# Patient Record
Sex: MALE | Race: WHITE | Employment: OTHER | URBAN - METROPOLITAN AREA
[De-identification: names, ages, dates, MRNs, and addresses within clinical notes are randomized per-mention and may not be internally consistent; named-entity substitution may affect disease eponyms.]

---

## 2017-06-23 PROBLEM — C34.12 SQUAMOUS CELL CARCINOMA OF BRONCHUS IN LEFT UPPER LOBE (HCC): Status: ACTIVE | Noted: 2017-06-23

## 2017-06-23 PROBLEM — C34.12 SQUAMOUS CELL CARCINOMA OF BRONCHUS IN LEFT UPPER LOBE (HCC): Status: RESOLVED | Noted: 2017-06-23 | Resolved: 2017-06-23

## 2017-06-23 PROBLEM — C34.32 SQUAMOUS CELL CARCINOMA OF BRONCHUS IN LEFT LOWER LOBE (HCC): Status: ACTIVE | Noted: 2017-06-23

## 2018-06-27 ENCOUNTER — HOSPITAL ENCOUNTER (OUTPATIENT)
Dept: RADIATION ONCOLOGY | Age: 73
Discharge: HOME | End: 2018-06-27
Payer: COMMERCIAL

## 2018-06-27 VITALS
RESPIRATION RATE: 18 BRPM | DIASTOLIC BLOOD PRESSURE: 67 MMHG | WEIGHT: 210 LBS | HEART RATE: 62 BPM | TEMPERATURE: 97.7 F | SYSTOLIC BLOOD PRESSURE: 101 MMHG

## 2018-06-27 DIAGNOSIS — C34.32 SQUAMOUS CELL CARCINOMA OF BRONCHUS IN LEFT LOWER LOBE (HCC): Primary | ICD-10-CM

## 2018-06-27 PROCEDURE — 99212 OFFICE O/P EST SF 10 MIN: CPT | Performed by: RADIOLOGY

## 2018-06-27 PROCEDURE — 99212 OFFICE O/P EST SF 10 MIN: CPT

## 2018-06-27 RX ORDER — PREGABALIN 100 MG/1
100 CAPSULE ORAL 2 TIMES DAILY
COMMUNITY
End: 2021-01-15 | Stop reason: ALTCHOICE

## 2018-06-27 RX ORDER — CALCIUM CARBONATE 200(500)MG
1 TABLET,CHEWABLE ORAL DAILY
COMMUNITY
End: 2021-01-15 | Stop reason: ALTCHOICE

## 2021-01-15 ENCOUNTER — HOSPITAL ENCOUNTER (INPATIENT)
Age: 76
LOS: 5 days | Discharge: HOME | DRG: 660 | End: 2021-01-20
Attending: EMERGENCY MEDICINE | Admitting: INTERNAL MEDICINE
Payer: MEDICARE

## 2021-01-15 ENCOUNTER — APPOINTMENT (OUTPATIENT)
Dept: ULTRASOUND IMAGING | Age: 76
DRG: 660 | End: 2021-01-15
Payer: MEDICARE

## 2021-01-15 DIAGNOSIS — N17.9 ACUTE KIDNEY INJURY (HCC): Primary | ICD-10-CM

## 2021-01-15 DIAGNOSIS — E87.5 HYPERKALEMIA: ICD-10-CM

## 2021-01-15 DIAGNOSIS — T80.818A: ICD-10-CM

## 2021-01-15 DIAGNOSIS — N17.9 AKI (ACUTE KIDNEY INJURY) (HCC): ICD-10-CM

## 2021-01-15 DIAGNOSIS — N20.0 KIDNEY STONE: ICD-10-CM

## 2021-01-15 PROBLEM — C34.32 MALIGNANT NEOPLASM OF LOWER LOBE OF LEFT LUNG (HCC): Status: ACTIVE | Noted: 2017-05-11

## 2021-01-15 PROBLEM — N40.1 BPH WITH OBSTRUCTION/LOWER URINARY TRACT SYMPTOMS: Status: ACTIVE | Noted: 2021-01-15

## 2021-01-15 PROBLEM — I10 BENIGN ESSENTIAL HTN: Status: ACTIVE | Noted: 2021-01-15

## 2021-01-15 PROBLEM — N40.0 BPH (BENIGN PROSTATIC HYPERPLASIA): Status: ACTIVE | Noted: 2021-01-15

## 2021-01-15 PROBLEM — I25.10 CORONARY ARTERY DISEASE INVOLVING NATIVE CORONARY ARTERY OF NATIVE HEART WITHOUT ANGINA PECTORIS: Status: ACTIVE | Noted: 2017-04-28

## 2021-01-15 PROBLEM — N13.8 BPH WITH OBSTRUCTION/LOWER URINARY TRACT SYMPTOMS: Status: ACTIVE | Noted: 2021-01-15

## 2021-01-15 PROBLEM — Z95.1 HX OF CORONARY ARTERY BYPASS GRAFT: Status: ACTIVE | Noted: 2021-01-15

## 2021-01-15 PROBLEM — D64.9 NORMOCYTIC ANEMIA: Status: ACTIVE | Noted: 2021-01-15

## 2021-01-15 PROBLEM — Z85.118 HISTORY OF LUNG CANCER: Status: ACTIVE | Noted: 2017-04-17

## 2021-01-15 PROBLEM — C64.9 RENAL CANCER (HCC): Status: ACTIVE | Noted: 2021-01-15

## 2021-01-15 PROBLEM — N18.9 ACUTE KIDNEY INJURY SUPERIMPOSED ON CKD (HCC): Status: ACTIVE | Noted: 2021-01-15

## 2021-01-15 LAB
ALBUMIN SERPL-MCNC: 3.3 G/DL (ref 3.5–5.2)
ALP BLD-CCNC: 68 U/L (ref 40–129)
ALT SERPL-CCNC: 7 U/L (ref 0–40)
ANION GAP SERPL CALCULATED.3IONS-SCNC: 10 MMOL/L (ref 7–16)
ANION GAP SERPL CALCULATED.3IONS-SCNC: 11 MMOL/L (ref 7–16)
AST SERPL-CCNC: 13 U/L (ref 0–39)
BASOPHILS ABSOLUTE: 0.02 E9/L (ref 0–0.2)
BASOPHILS RELATIVE PERCENT: 0.3 % (ref 0–2)
BILIRUB SERPL-MCNC: 0.3 MG/DL (ref 0–1.2)
BILIRUBIN URINE: NEGATIVE
BLOOD, URINE: NEGATIVE
BUN BLDV-MCNC: 39 MG/DL (ref 8–23)
BUN BLDV-MCNC: 42 MG/DL (ref 8–23)
CALCIUM SERPL-MCNC: 8.8 MG/DL (ref 8.6–10.2)
CALCIUM SERPL-MCNC: 8.9 MG/DL (ref 8.6–10.2)
CHLORIDE BLD-SCNC: 109 MMOL/L (ref 98–107)
CHLORIDE BLD-SCNC: 111 MMOL/L (ref 98–107)
CLARITY: CLEAR
CO2: 15 MMOL/L (ref 22–29)
CO2: 17 MMOL/L (ref 22–29)
COLOR: NORMAL
CREAT SERPL-MCNC: 3.5 MG/DL (ref 0.7–1.2)
CREAT SERPL-MCNC: 3.5 MG/DL (ref 0.7–1.2)
CREATININE URINE: 39 MG/DL (ref 40–278)
EOSINOPHILS ABSOLUTE: 0.02 E9/L (ref 0.05–0.5)
EOSINOPHILS RELATIVE PERCENT: 0.3 % (ref 0–6)
GFR AFRICAN AMERICAN: 21
GFR AFRICAN AMERICAN: 21
GFR NON-AFRICAN AMERICAN: 17 ML/MIN/1.73
GFR NON-AFRICAN AMERICAN: 17 ML/MIN/1.73
GLUCOSE BLD-MCNC: 90 MG/DL (ref 74–99)
GLUCOSE BLD-MCNC: 98 MG/DL (ref 74–99)
GLUCOSE URINE: NEGATIVE MG/DL
HCT VFR BLD CALC: 32.5 % (ref 37–54)
HEMOGLOBIN: 9.9 G/DL (ref 12.5–16.5)
IMMATURE GRANULOCYTES #: 0.02 E9/L
IMMATURE GRANULOCYTES %: 0.3 % (ref 0–5)
KETONES, URINE: NEGATIVE MG/DL
LEUKOCYTE ESTERASE, URINE: NEGATIVE
LYMPHOCYTES ABSOLUTE: 1.1 E9/L (ref 1.5–4)
LYMPHOCYTES RELATIVE PERCENT: 19.1 % (ref 20–42)
MAGNESIUM: 1.5 MG/DL (ref 1.6–2.6)
MCH RBC QN AUTO: 30.1 PG (ref 26–35)
MCHC RBC AUTO-ENTMCNC: 30.5 % (ref 32–34.5)
MCV RBC AUTO: 98.8 FL (ref 80–99.9)
MONOCYTES ABSOLUTE: 0.49 E9/L (ref 0.1–0.95)
MONOCYTES RELATIVE PERCENT: 8.5 % (ref 2–12)
NEUTROPHILS ABSOLUTE: 4.12 E9/L (ref 1.8–7.3)
NEUTROPHILS RELATIVE PERCENT: 71.5 % (ref 43–80)
NITRITE, URINE: NEGATIVE
PDW BLD-RTO: 18.3 FL (ref 11.5–15)
PH UA: 6 (ref 5–9)
PHOSPHORUS: 3.3 MG/DL (ref 2.5–4.5)
PLATELET # BLD: 252 E9/L (ref 130–450)
PMV BLD AUTO: 10.2 FL (ref 7–12)
POTASSIUM REFLEX MAGNESIUM: 5.6 MMOL/L (ref 3.5–5)
POTASSIUM SERPL-SCNC: 5.1 MMOL/L (ref 3.5–5)
PROTEIN UA: NEGATIVE MG/DL
RBC # BLD: 3.29 E12/L (ref 3.8–5.8)
SODIUM BLD-SCNC: 136 MMOL/L (ref 132–146)
SODIUM BLD-SCNC: 137 MMOL/L (ref 132–146)
SODIUM URINE: 157 MMOL/L
SPECIFIC GRAVITY UA: 1.01 (ref 1–1.03)
TOTAL CK: 66 U/L (ref 20–200)
TOTAL PROTEIN: 6.2 G/DL (ref 6.4–8.3)
TROPONIN: <0.01 NG/ML (ref 0–0.03)
TROPONIN: <0.01 NG/ML (ref 0–0.03)
UROBILINOGEN, URINE: 0.2 E.U./DL
WBC # BLD: 5.8 E9/L (ref 4.5–11.5)

## 2021-01-15 PROCEDURE — 96372 THER/PROPH/DIAG INJ SC/IM: CPT

## 2021-01-15 PROCEDURE — 99223 1ST HOSP IP/OBS HIGH 75: CPT | Performed by: INTERNAL MEDICINE

## 2021-01-15 PROCEDURE — 6360000002 HC RX W HCPCS: Performed by: EMERGENCY MEDICINE

## 2021-01-15 PROCEDURE — 82570 ASSAY OF URINE CREATININE: CPT

## 2021-01-15 PROCEDURE — 85025 COMPLETE CBC W/AUTO DIFF WBC: CPT

## 2021-01-15 PROCEDURE — 2580000003 HC RX 258: Performed by: STUDENT IN AN ORGANIZED HEALTH CARE EDUCATION/TRAINING PROGRAM

## 2021-01-15 PROCEDURE — 51798 US URINE CAPACITY MEASURE: CPT

## 2021-01-15 PROCEDURE — 96365 THER/PROPH/DIAG IV INF INIT: CPT

## 2021-01-15 PROCEDURE — 6370000000 HC RX 637 (ALT 250 FOR IP): Performed by: STUDENT IN AN ORGANIZED HEALTH CARE EDUCATION/TRAINING PROGRAM

## 2021-01-15 PROCEDURE — 93005 ELECTROCARDIOGRAM TRACING: CPT | Performed by: STUDENT IN AN ORGANIZED HEALTH CARE EDUCATION/TRAINING PROGRAM

## 2021-01-15 PROCEDURE — 83735 ASSAY OF MAGNESIUM: CPT

## 2021-01-15 PROCEDURE — 6360000002 HC RX W HCPCS: Performed by: STUDENT IN AN ORGANIZED HEALTH CARE EDUCATION/TRAINING PROGRAM

## 2021-01-15 PROCEDURE — 1200000000 HC SEMI PRIVATE

## 2021-01-15 PROCEDURE — 99283 EMERGENCY DEPT VISIT LOW MDM: CPT

## 2021-01-15 PROCEDURE — 84484 ASSAY OF TROPONIN QUANT: CPT

## 2021-01-15 PROCEDURE — 76770 US EXAM ABDO BACK WALL COMP: CPT

## 2021-01-15 PROCEDURE — 80048 BASIC METABOLIC PNL TOTAL CA: CPT

## 2021-01-15 PROCEDURE — 84300 ASSAY OF URINE SODIUM: CPT

## 2021-01-15 PROCEDURE — 82550 ASSAY OF CK (CPK): CPT

## 2021-01-15 PROCEDURE — 80053 COMPREHEN METABOLIC PANEL: CPT

## 2021-01-15 PROCEDURE — 81003 URINALYSIS AUTO W/O SCOPE: CPT

## 2021-01-15 PROCEDURE — 84100 ASSAY OF PHOSPHORUS: CPT

## 2021-01-15 PROCEDURE — 96375 TX/PRO/DX INJ NEW DRUG ADDON: CPT

## 2021-01-15 PROCEDURE — 36415 COLL VENOUS BLD VENIPUNCTURE: CPT

## 2021-01-15 RX ORDER — ENALAPRIL MALEATE 20 MG/1
10 TABLET ORAL DAILY
Status: ON HOLD | COMMUNITY
End: 2021-01-20 | Stop reason: HOSPADM

## 2021-01-15 RX ORDER — TAMSULOSIN HYDROCHLORIDE 0.4 MG/1
0.4 CAPSULE ORAL DAILY
Status: ON HOLD | COMMUNITY
End: 2021-01-20 | Stop reason: SDUPTHER

## 2021-01-15 RX ORDER — DEXTROSE MONOHYDRATE 25 G/50ML
25 INJECTION, SOLUTION INTRAVENOUS ONCE
Status: COMPLETED | OUTPATIENT
Start: 2021-01-15 | End: 2021-01-15

## 2021-01-15 RX ORDER — GABAPENTIN 300 MG/1
600 CAPSULE ORAL 2 TIMES DAILY
Status: DISCONTINUED | OUTPATIENT
Start: 2021-01-15 | End: 2021-01-20 | Stop reason: HOSPADM

## 2021-01-15 RX ORDER — ASPIRIN 81 MG/1
81 TABLET ORAL DAILY
Status: DISCONTINUED | OUTPATIENT
Start: 2021-01-16 | End: 2021-01-20 | Stop reason: HOSPADM

## 2021-01-15 RX ORDER — CARVEDILOL 6.25 MG/1
12.5 TABLET ORAL 2 TIMES DAILY WITH MEALS
Status: DISCONTINUED | OUTPATIENT
Start: 2021-01-15 | End: 2021-01-20 | Stop reason: HOSPADM

## 2021-01-15 RX ORDER — ALPRAZOLAM 0.5 MG/1
0.5 TABLET ORAL PRN
Status: DISCONTINUED | OUTPATIENT
Start: 2021-01-15 | End: 2021-01-20 | Stop reason: HOSPADM

## 2021-01-15 RX ORDER — DIPHENHYDRAMINE HCL 25 MG
50 TABLET ORAL DAILY
COMMUNITY

## 2021-01-15 RX ORDER — ZINC GLUCONATE 50 MG
50 TABLET ORAL DAILY
COMMUNITY

## 2021-01-15 RX ORDER — SODIUM CHLORIDE 9 MG/ML
0.9 INJECTION INTRAVENOUS ONCE
Status: DISCONTINUED | OUTPATIENT
Start: 2021-01-15 | End: 2021-01-20 | Stop reason: HOSPADM

## 2021-01-15 RX ORDER — UMECLIDINIUM 62.5 UG/1
1 AEROSOL, POWDER ORAL DAILY
COMMUNITY

## 2021-01-15 RX ORDER — CALCIUM CARBONATE 200(500)MG
1 TABLET,CHEWABLE ORAL DAILY PRN
Status: DISCONTINUED | OUTPATIENT
Start: 2021-01-15 | End: 2021-01-20 | Stop reason: HOSPADM

## 2021-01-15 RX ORDER — ATORVASTATIN CALCIUM 20 MG/1
20 TABLET, FILM COATED ORAL DAILY
Status: DISCONTINUED | OUTPATIENT
Start: 2021-01-16 | End: 2021-01-20 | Stop reason: HOSPADM

## 2021-01-15 RX ORDER — ACETAMINOPHEN 325 MG/1
650 TABLET ORAL EVERY 6 HOURS PRN
Status: DISCONTINUED | OUTPATIENT
Start: 2021-01-15 | End: 2021-01-20 | Stop reason: HOSPADM

## 2021-01-15 RX ORDER — DEXAMETHASONE 4 MG/1
8 TABLET ORAL SEE ADMIN INSTRUCTIONS
COMMUNITY

## 2021-01-15 RX ORDER — SODIUM CHLORIDE 0.9 % (FLUSH) 0.9 %
10 SYRINGE (ML) INJECTION EVERY 12 HOURS SCHEDULED
Status: DISCONTINUED | OUTPATIENT
Start: 2021-01-15 | End: 2021-01-20 | Stop reason: HOSPADM

## 2021-01-15 RX ORDER — DEXAMETHASONE SODIUM PHOSPHATE 4 MG/ML
4 INJECTION, SOLUTION INTRA-ARTICULAR; INTRALESIONAL; INTRAMUSCULAR; INTRAVENOUS; SOFT TISSUE EVERY 24 HOURS
Status: DISCONTINUED | OUTPATIENT
Start: 2021-01-15 | End: 2021-01-20 | Stop reason: HOSPADM

## 2021-01-15 RX ORDER — 0.9 % SODIUM CHLORIDE 0.9 %
1000 INTRAVENOUS SOLUTION INTRAVENOUS ONCE
Status: COMPLETED | OUTPATIENT
Start: 2021-01-15 | End: 2021-01-15

## 2021-01-15 RX ORDER — PROMETHAZINE HYDROCHLORIDE 25 MG/1
12.5 TABLET ORAL EVERY 6 HOURS PRN
Status: DISCONTINUED | OUTPATIENT
Start: 2021-01-15 | End: 2021-01-20 | Stop reason: HOSPADM

## 2021-01-15 RX ORDER — SODIUM CHLORIDE 0.9 % (FLUSH) 0.9 %
10 SYRINGE (ML) INJECTION PRN
Status: DISCONTINUED | OUTPATIENT
Start: 2021-01-15 | End: 2021-01-20 | Stop reason: HOSPADM

## 2021-01-15 RX ORDER — ACETAMINOPHEN 500 MG
1000 TABLET ORAL EVERY 6 HOURS PRN
COMMUNITY

## 2021-01-15 RX ORDER — TAMSULOSIN HYDROCHLORIDE 0.4 MG/1
0.4 CAPSULE ORAL NIGHTLY
Status: DISCONTINUED | OUTPATIENT
Start: 2021-01-15 | End: 2021-01-20 | Stop reason: HOSPADM

## 2021-01-15 RX ORDER — M-VIT,TX,IRON,MINS/CALC/FOLIC 27MG-0.4MG
1 TABLET ORAL DAILY
COMMUNITY

## 2021-01-15 RX ORDER — SODIUM CHLORIDE 9 MG/ML
INJECTION, SOLUTION INTRAVENOUS CONTINUOUS
Status: DISCONTINUED | OUTPATIENT
Start: 2021-01-15 | End: 2021-01-16

## 2021-01-15 RX ORDER — POLYETHYLENE GLYCOL 3350 17 G/17G
17 POWDER, FOR SOLUTION ORAL DAILY PRN
Status: DISCONTINUED | OUTPATIENT
Start: 2021-01-15 | End: 2021-01-20 | Stop reason: HOSPADM

## 2021-01-15 RX ORDER — GABAPENTIN 300 MG/1
600 CAPSULE ORAL 2 TIMES DAILY
COMMUNITY

## 2021-01-15 RX ORDER — LANOLIN ALCOHOL/MO/W.PET/CERES
1000 CREAM (GRAM) TOPICAL DAILY
COMMUNITY

## 2021-01-15 RX ORDER — SODIUM CHLORIDE 9 MG/ML
0.9 INJECTION INTRAVENOUS ONCE
Status: DISCONTINUED | OUTPATIENT
Start: 2021-01-15 | End: 2021-01-15

## 2021-01-15 RX ORDER — ACETAMINOPHEN 650 MG/1
650 SUPPOSITORY RECTAL EVERY 6 HOURS PRN
Status: DISCONTINUED | OUTPATIENT
Start: 2021-01-15 | End: 2021-01-20 | Stop reason: HOSPADM

## 2021-01-15 RX ORDER — CALCIUM CARBONATE 300MG(750)
400 TABLET,CHEWABLE ORAL DAILY
COMMUNITY

## 2021-01-15 RX ORDER — ONDANSETRON 2 MG/ML
4 INJECTION INTRAMUSCULAR; INTRAVENOUS EVERY 6 HOURS PRN
Status: DISCONTINUED | OUTPATIENT
Start: 2021-01-15 | End: 2021-01-20 | Stop reason: HOSPADM

## 2021-01-15 RX ORDER — HEPARIN SODIUM 5000 [USP'U]/ML
5000 INJECTION, SOLUTION INTRAVENOUS; SUBCUTANEOUS EVERY 8 HOURS SCHEDULED
Status: DISCONTINUED | OUTPATIENT
Start: 2021-01-15 | End: 2021-01-20 | Stop reason: HOSPADM

## 2021-01-15 RX ORDER — CALCIUM CARBONATE 500(1250)
500 TABLET ORAL DAILY
COMMUNITY

## 2021-01-15 RX ADMIN — SODIUM CHLORIDE, PRESERVATIVE FREE 10 ML: 5 INJECTION INTRAVENOUS at 20:46

## 2021-01-15 RX ADMIN — SODIUM CHLORIDE: 9 INJECTION, SOLUTION INTRAVENOUS at 20:47

## 2021-01-15 RX ADMIN — DEXTROSE MONOHYDRATE 25 G: 25 INJECTION, SOLUTION INTRAVENOUS at 13:44

## 2021-01-15 RX ADMIN — SODIUM CHLORIDE 1000 ML: 9 INJECTION, SOLUTION INTRAVENOUS at 11:53

## 2021-01-15 RX ADMIN — GABAPENTIN 600 MG: 300 CAPSULE ORAL at 18:54

## 2021-01-15 RX ADMIN — HEPARIN SODIUM 5000 UNITS: 5000 INJECTION INTRAVENOUS; SUBCUTANEOUS at 20:46

## 2021-01-15 RX ADMIN — CARVEDILOL 12.5 MG: 6.25 TABLET, FILM COATED ORAL at 18:54

## 2021-01-15 RX ADMIN — TAMSULOSIN HYDROCHLORIDE 0.4 MG: 0.4 CAPSULE ORAL at 20:46

## 2021-01-15 RX ADMIN — CALCIUM GLUCONATE 1 G: 98 INJECTION, SOLUTION INTRAVENOUS at 11:53

## 2021-01-15 RX ADMIN — HYALURONIDASE (HUMAN RECOMBINANT) 15 UNITS: 150 INJECTION, SOLUTION SUBCUTANEOUS at 13:00

## 2021-01-15 RX ADMIN — INSULIN HUMAN 5 UNITS: 100 INJECTION, SOLUTION PARENTERAL at 13:44

## 2021-01-15 RX ADMIN — DEXAMETHASONE SODIUM PHOSPHATE 4 MG: 4 INJECTION, SOLUTION INTRAMUSCULAR; INTRAVENOUS at 18:55

## 2021-01-15 ASSESSMENT — ENCOUNTER SYMPTOMS
WHEEZING: 0
ABDOMINAL PAIN: 0
COUGH: 0
BACK PAIN: 0
VOMITING: 0
EYE PAIN: 0
EYE DISCHARGE: 0
SHORTNESS OF BREATH: 0
SINUS PRESSURE: 0
EYE REDNESS: 0
DIARRHEA: 0
NAUSEA: 0
SORE THROAT: 0

## 2021-01-15 NOTE — CONSULTS
Nephrology Consult Note  Patient's Name: Esperanza Bush  6:26 PM  1/15/2021    Nephrologist: Dr Kaitlin Ramirez    Reason for Consult:  BRITNI and hyperkalemia  Requesting Physician:  No primary care provider on file. Chief Complaint:  Abnormal labs    History Obtained From:  patient and past medical records    History of Present Ilness:    Esperanza Bush is a 70 y.o. 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 He was sent to the ER by the [facility] for abnormal labs. Per the patient he is receiving treatment for his cancer, he is unsure what is being given. His renal function has been abnormal recently and his most recent lab work prompted them to have him come to the ER. He states he began chemo aprox 2 weeks ago, he was receiving it 3 times per week. His baseline Cr in Dec 2020 was 1.46mg/dl. Labs upon arrival to the ER show Na 136, K 5.6, Cl 111, CO2 15, BUN 42, Cr 3.5. Hyperkalemia was treated medically in the ER. This morning his K remained elevated at 6.5, it was again treated medically. He was taking enalapril PTA, it is currently being held. DEBBI showed hydronephrosis. Upon assessment, patient just returned to his room from CT scan. He states he is feeling ok. He admits to having diarrhea for the past 2 weeks as well as decreased appetite. He denies any kidney stones or hematuria. Past Medical History:   Diagnosis Date    Heart attack [facility]) 2009    Hypertension 2009    Lung cancer [facility]) 2017    Renal mass        Past Surgical History:   Procedure Laterality Date    APPENDECTOMY  1965    LOBECTOMY Left 05/2017       Family History   Problem Relation Age of Onset    Cancer Father 71        lung        reports that he quit smoking about 11 years ago. His smoking use included cigarettes. He has a 40.00 pack-year smoking history. He has never used smokeless tobacco. He reports that he does not drink alcohol or use drugs. Allergies:  Patient has no known allergies. Current Medications:        sodium chloride (PF) 0.9 % injection 0.9 mL, Once      dexamethasone (DECADRON) injection 4 mg, Q24H      tamsulosin (FLOMAX) capsule 0.4 mg, Nightly      gabapentin (NEURONTIN) capsule 600 mg, BID        Review of Systems:   Pertinent items are noted in HPI. Physical exam:   Constitutional:    Vitals: VITALS:  BP (!) 169/88   Pulse 89   Temp 97.1 °F (36.2 °C)   Resp 16   SpO2 99%   24HR INTAKE/OUTPUT:  No intake or output data in the 24 hours ending 01/15/21 1826  URINARY CATHETER OUTPUT (Garcia):     Skin: no rash, turgor wnl  Heent:  eomi, mmm  Neck: no bruits or jvd noted  Cardiovascular:  S1, S2 without m/r/g.  Right chest mediport  Respiratory: CTA B without w/r/r  Abdomen:  +bs, soft, nt, nd  Ext: neg lower extremity edema  Psychiatric: mood and affect appropriate  Musculoskeletal:  Rom, muscular strength intact    Data:   Labs:  CBC:   Lab Results   Component Value Date    WBC 3.1 01/16/2021    RBC 3.17 01/16/2021    HGB 9.4 01/16/2021    HCT 31.7 01/16/2021    .0 01/16/2021    MCH 29.7 01/16/2021    MCHC 29.7 01/16/2021    RDW 18.4 01/16/2021     01/16/2021    MPV 10.7 01/16/2021     BMP:    Lab Results   Component Value Date     01/16/2021    K 6.5 01/16/2021    K 5.6 01/15/2021     01/16/2021    CO2 17 01/16/2021    BUN 41 01/16/2021    LABALBU 3.3 01/15/2021    CREATININE 3.5 01/16/2021    CALCIUM 9.0 01/16/2021    GFRAA 21 01/16/2021    LABGLOM 17 01/16/2021    GLUCOSE 117 01/16/2021     Ionized Calcium:  No results found for: IONCA  Magnesium:    Lab Results   Component Value Date    MG 1.6 01/16/2021     Phosphorus:    Lab Results   Component Value Date    PHOS 3.3 01/15/2021     VITAMIN B12: No components found for: B12  FOLATE:  No results found for: FOLATE  IRON:  No results found for: IRON  Iron Saturation:  No components found for: PERCENTFE  TIBC:  No results found for: TIBC  FERRITIN:  No results found for: FERRITIN     Imaging: CT ABDOMEN PELVIS WO CONTRAST [[id number]] Collected: 01/16/21 1018      Order Status: Completed Updated: 01/16/21 1038     Narrative:       EXAMINATION:   CT OF THE ABDOMEN AND PELVIS WITHOUT CONTRAST 1/16/2021 8:49 am   TECHNIQUE:   CT of the abdomen and pelvis was performed without the administration of   intravenous contrast. Multiplanar reformatted images are provided for review. Dose modulation, iterative reconstruction, and/or weight based adjustment of   the mA/kV was utilized to reduce the radiation dose to as low as reasonably   achievable. COMPARISON:   None. HISTORY:   ORDERING SYSTEM PROVIDED HISTORY: hydronephrosis   TECHNOLOGIST PROVIDED HISTORY:   Reason for exam:->hydronephrosis   FINDINGS:   Lower Chest: There is small pleural effusions posteriorly.  The left lung   base is moderately atelectatic. Organs:  The gallbladder is unremarkable.  The liver is normal for non   enhanced technique.  The adrenal glands are normal.  The spleen is intact and   demonstrates normal morphology and attenuation/echogenicity. Lorelie Smith  pancreas   demonstrates normal morphology and attenuation/echogenicity. Lorelie Smith left kidney   is moderately enlarged, there is moderate hydronephrosis but no sign of an   obstructing mass or calculus.  A 5 mm calcification in the central right   kidney may represent a calculus or vascular calcification.  A 3 cm cyst lies   in the upper pole of the right kidney.  The right kidney is minimally   hydronephrotic. Pedro Rain is a small hiatal hernia. GI/Bowel: The GI tract has normal course caliber and morphology.  There are   scattered colonic diverticuli. The appendix is not identified, there are no   secondary signs of appendicitis or right lower quadrant inflammation. Pelvis: The prostate is mildly hypertrophied. Bladder is unremarkable in   appearance.    Peritoneum/Retroperitoneum: Minimal free fluid lies within the pelvis. Pedro Rain are no signs of pelvic or retro peritoneal lymphadenopathy. Vasculature: The abdominal aorta, IVC and their branches are normal aside   from mild aortic atherosclerotic vascular disease. Bones/Soft Tissues: The hips, bony pelvis, and lumbar spine appear   unremarkable.  The abdominal wall is normal aside from small fat containing   umbilical and inguinal hernias.     Impression:       Moderate left and minimal right hydronephrosis, no sign of obstructing   calculus or mass, suspect bladder outlet obstruction   Small pleural effusions, may minimal free intraperitoneal fluid. Left lower lobe atelectasis     US RETROPERITONEAL COMPLETE [[id number]] Collected: 01/15/21 2149     Order Status: Completed Updated: 01/15/21 2215     Narrative:       EXAMINATION:   RETROPERITONEAL ULTRASOUND OF THE KIDNEYS AND URINARY BLADDER   1/15/2021   COMPARISON:   None   HISTORY:   ORDERING SYSTEM PROVIDED HISTORY: BRITNI on CKD   TECHNOLOGIST PROVIDED HISTORY:   Reason for exam:->BRITNI on CKD   What reading provider will be dictating this exam?->CRC   FINDINGS:   Kidneys: The right kidney measures 8.7 x 4.7 x 4.3cm in length and the left kidney   measures 11 x 6.6 x 5.8 cm in length. Kidneys demonstrate normal cortical echogenicity.  There is mild   hydronephrosis within the left kidney.  No evidence of hydronephrosis or   intrarenal stones. Bladder:   Unremarkable appearance of the bladder.  No significant post void   residual.The urinary bladder appears unremarkable. Right urinary jet is   noted. Left urinary jet is not visible.     Impression:       Right kidney is mildly atrophic. Mild hydronephrosis of left kidney. Bladder is unremarkable. Left urinary jet is not visualized           Assessment and Plan  1.  Stage II BRITNI in the setting of urinary outlet obstruction exacerbated by the diarrhea and decreased PO intake and ACEI  CT abd with moderate hydro L and mild hydro R kidney  FeNa >1% supporting intrarenal origin Bladder scan with 264mL noted  Cr 3.5  PLAN: 1. Follow labs  2. Consult urology  3. Insert bourne catheter  4. Hold ACEI  5. Continue IVF    2. HTN  BP at goal <130/80  PLAN: 1. Continue current medications    3. Hyperkalemia in the setting of the BRITNI  K 6.5-->Treated medically this AM  PLAN: 1. Repeat BMP at 1200  2. Low K diet    4. NAG metabolic acidosis in the setting of the BRITNI  HCO3 goal >=22  PLAN: 1. Start PO HCO3    5. Anemia   Hgb below goal >10  PLAN: 1. Defer to heme/onc    6. CKD G3A with baseline Cr 1.46mg/dl and eGFR 47ml/min    7. Lung cancer with mets to kidney  Follows with [facility]  Currently receiving Chemotherapy 3 times per week  PLAN: 1. Defer to heme/onc    Thank you Dr. Lucas primary care provider on file.  for allowing us to participate in care of ALTA Kruger NP  6:26 PM  1/15/2021

## 2021-01-15 NOTE — ED PROVIDER NOTES
[facility]  Department of Emergency Medicine     Written by: Malinda Blizzard, DO  Patient Name: Tim Sharp  Attending Provider: Marium Leone MD  Admit Date: 1/15/2021 10:02 AM  MRN: [id number]                   : 1945        Chief Complaint   Patient presents with    Abnormal Lab     BUN/cr elevated sent in by [facility]    - Chief complaint    Patient is a 81-year-old male past medical history of hyperlipidemia, CAD and lung cancer with mets to kidney. Patient presents as referral from [facility]. Patient currently undergoing treatment for a \"tumor on his kidneys\". Patient is unsure of if he has been formally diagnosed with cancer at this time. He states that he has been receiving infusion therapies at the [facility] and they have been checking his blood work regularly. He does not know his current chemotherapy regimen. He notes that over the last couple weeks have noted that his BUN and creatinine has been elevated. He does not remember the exact numbers. He states that he received a phone call today told him to come to the emergency department for further evaluation. Patient states that he has no real symptoms. He states that he is feeling well and has been in his usual state of health. He states that he has been taking all of his medications as prescribed. Patient denies any fevers, chills, nausea, vomiting, chest pain, cough or shortness of breath. The history is provided by the patient. No  was used. Review of Systems   Constitutional: Negative for chills and fever. HENT: Negative for ear pain, sinus pressure and sore throat. Eyes: Negative for pain, discharge and redness. Respiratory: Negative for cough, shortness of breath and wheezing. Cardiovascular: Negative for chest pain. Gastrointestinal: Negative for abdominal pain, diarrhea, nausea and vomiting. Genitourinary: Negative for dysuria and frequency. Musculoskeletal: Negative for arthralgias and back pain. Skin: Negative for rash and wound. Neurological: Negative for weakness and headaches. Hematological: Negative for adenopathy. All other systems reviewed and are negative. Physical Exam  Vitals signs and nursing note reviewed. Constitutional:       Appearance: He is well-developed. HENT:      Head: Normocephalic and atraumatic. Eyes:      Conjunctiva/sclera: Conjunctivae normal.   Neck:      Musculoskeletal: Normal range of motion and neck supple. Cardiovascular:      Rate and Rhythm: Normal rate and regular rhythm. Heart sounds: Normal heart sounds. No murmur. Pulmonary:      Effort: Pulmonary effort is normal. No respiratory distress. Breath sounds: Normal breath sounds. No wheezing or rales. Abdominal:      General: Bowel sounds are normal.      Palpations: Abdomen is soft. Tenderness: There is no abdominal tenderness. There is no guarding or rebound. Musculoskeletal:         General: No tenderness or deformity. Skin:     General: Skin is warm and dry. Neurological:      Mental Status: He is alert and oriented to person, place, and time. Cranial Nerves: No cranial nerve deficit. Coordination: Coordination normal.          Procedures   EKG #1:  Interpreted by emergency department physician unless otherwise noted. Time:  1054    Rate: 90  Rhythm: Sinus. Interpretation: EKG reviewed, normal sinus rhythm, rate 90, left axis, mild ST segment elevations in V2 and V3 . Comparison: No previous EKGs to compare.       MDM  Number of Diagnoses or Management Options  Acute kidney injury ([facility] [address]  Extravasation of vesicant agent  Hyperkalemia Diagnosis management comments: Patient is a 22-year-old male past medical history of lung cancer with mets to the kidneys, CAD, and hypertension. Patient presents from home secondary to abnormal laboratory work elevated BUN/creatinine. Patient asymptomatic on presentation, vital signs stable. On physical exam heart regular rhythm, lungs clear to oscillation bilaterally, abdomen soft nontender. CBC obtained demonstrated mild anemia 9.9, no leukocytosis, CMP demonstrated nonhemolyzed potassium of 5.6, as well as elevated BUN of 42 and elevated creatinine of 3.5. Per review of records from [facility] clinic in [facility] patient's baseline appears to be about 1.1, troponin less than 0.01. EKG obtained demonstrate no acute ischemic changes with some nonspecific ST segment changes no significant peaked T waves. Patient was started on hyperkalemia protocol due to acute renal failure, patient given 1 g calcium gluconate as well as 5 units regular insulin D50. During infusion of calcium gluconate there was some extravasation, discussed with pharmacist recommend hyaluronidase injection. Patient was injected with 15 units of hyaluronidase in a circumferential fashion. Warm compresses applied to the skin patient minimally symptomatic. We will continue to monitor. Decision made to meet patient due to acute renal failure, case discussed with hospitalist who agreed to meet patient. Plan of care discussed with patient include admission, all questions were answered patient was in agreement with plan of care was admitted to the hospital in stable condition.        Amount and/or Complexity of Data Reviewed  Clinical lab tests: ordered and reviewed    Risk of Complications, Morbidity, and/or Mortality  Presenting problems: moderate  Diagnostic procedures: moderate  Management options: moderate    Patient Progress  Patient progress: stable       ED Course as of Robert 15 1553   Fri Robert 15, 2021 1300 Provider notified that calcium gluconate did extravasate during infusion. Discussed with pharmacy hyaluronidase 15 units administered in 5 separate actions. [BP]      ED Course User Index  [BP] Starla Wise, DO      --------------------------------------------- PAST HISTORY ---------------------------------------------  Past Medical History:  has a past medical history of Heart attack ([facility] [address] Hypertension, Lung cancer ([facility] [address] and Renal mass. Past Surgical History:  has a past surgical history that includes lobectomy (Left, 05/2017) and Appendectomy (1965). Social History:  reports that he quit smoking about 11 years ago. His smoking use included cigarettes. He has a 40.00 pack-year smoking history. He has never used smokeless tobacco. He reports that he does not drink alcohol or use drugs. Family History: family history includes Cancer (age of onset: 71) in his father. The patients home medications have been reviewed. Allergies: Patient has no known allergies.     -------------------------------------------------- RESULTS -------------------------------------------------    LABS:  Results for orders placed or performed during the hospital encounter of 01/15/21   CBC Auto Differential   Result Value Ref Range    WBC 5.8 4.5 - 11.5 E9/L    RBC 3.29 (L) 3.80 - 5.80 E12/L    Hemoglobin 9.9 (L) 12.5 - 16.5 g/dL    Hematocrit 32.5 (L) 37.0 - 54.0 %    MCV 98.8 80.0 - 99.9 fL    MCH 30.1 26.0 - 35.0 pg    MCHC 30.5 (L) 32.0 - 34.5 %    RDW 18.3 (H) 11.5 - 15.0 fL    Platelets 021 872 - 735 E9/L    MPV 10.2 7.0 - 12.0 fL    Neutrophils % 71.5 43.0 - 80.0 %    Immature Granulocytes % 0.3 0.0 - 5.0 %    Lymphocytes % 19.1 (L) 20.0 - 42.0 %    Monocytes % 8.5 2.0 - 12.0 %    Eosinophils % 0.3 0.0 - 6.0 %    Basophils % 0.3 0.0 - 2.0 %    Neutrophils Absolute 4.12 1.80 - 7.30 E9/L    Immature Granulocytes # 0.02 E9/L    Lymphocytes Absolute 1.10 (L) 1.50 - 4.00 E9/L I have spoken with the patient and discussed todays results, in addition to providing specific details for the plan of care and counseling regarding the diagnosis and prognosis. Their questions are answered at this time and they are agreeable with the plan of admission.    --------------------------------- ADDITIONAL PROVIDER NOTES ---------------------------------  Consultations:  Time: 1440. Spoke with hospitalist.  Discussed case. They will admit the patient. This patient's ED course included: a personal history and physicial examination, re-evaluation prior to disposition, IV medications and cardiac monitoring    This patient has remained hemodynamically stable during their ED course. Diagnosis:  1. Acute kidney injury ([address]    2. Hyperkalemia    3. Extravasation of vesicant agent        Disposition:  Patient's disposition: Admit to telemetry  Patient's condition is stable. Patient was seen and evaluated by myself and my attending Enriqueta Murphy MD. Assessment and Plan discussed with attending provider, please see attestation for final plan of care.      Hiawatha Beards, DO Sheryle Rude, DO  Resident  01/15/21 4390

## 2021-01-16 ENCOUNTER — APPOINTMENT (OUTPATIENT)
Dept: CT IMAGING | Age: 76
DRG: 660 | End: 2021-01-16
Payer: MEDICARE

## 2021-01-16 LAB
ANION GAP SERPL CALCULATED.3IONS-SCNC: 11 MMOL/L (ref 7–16)
ANION GAP SERPL CALCULATED.3IONS-SCNC: 14 MMOL/L (ref 7–16)
ANION GAP SERPL CALCULATED.3IONS-SCNC: 14 MMOL/L (ref 7–16)
ANION GAP SERPL CALCULATED.3IONS-SCNC: 16 MMOL/L (ref 7–16)
ANION GAP SERPL CALCULATED.3IONS-SCNC: 9 MMOL/L (ref 7–16)
BASOPHILS ABSOLUTE: 0.01 E9/L (ref 0–0.2)
BASOPHILS RELATIVE PERCENT: 0.3 % (ref 0–2)
BUN BLDV-MCNC: 40 MG/DL (ref 8–23)
BUN BLDV-MCNC: 41 MG/DL (ref 8–23)
CALCIUM SERPL-MCNC: 8.9 MG/DL (ref 8.6–10.2)
CALCIUM SERPL-MCNC: 9 MG/DL (ref 8.6–10.2)
CALCIUM SERPL-MCNC: 9.1 MG/DL (ref 8.6–10.2)
CALCIUM SERPL-MCNC: 9.2 MG/DL (ref 8.6–10.2)
CALCIUM SERPL-MCNC: 9.6 MG/DL (ref 8.6–10.2)
CHLORIDE BLD-SCNC: 104 MMOL/L (ref 98–107)
CHLORIDE BLD-SCNC: 106 MMOL/L (ref 98–107)
CHLORIDE BLD-SCNC: 107 MMOL/L (ref 98–107)
CHLORIDE BLD-SCNC: 108 MMOL/L (ref 98–107)
CHLORIDE BLD-SCNC: 111 MMOL/L (ref 98–107)
CO2: 17 MMOL/L (ref 22–29)
CO2: 19 MMOL/L (ref 22–29)
CO2: 20 MMOL/L (ref 22–29)
CREAT SERPL-MCNC: 3.3 MG/DL (ref 0.7–1.2)
CREAT SERPL-MCNC: 3.4 MG/DL (ref 0.7–1.2)
CREAT SERPL-MCNC: 3.5 MG/DL (ref 0.7–1.2)
CREAT SERPL-MCNC: 3.6 MG/DL (ref 0.7–1.2)
CREAT SERPL-MCNC: 3.6 MG/DL (ref 0.7–1.2)
EKG ATRIAL RATE: 90 BPM
EKG P AXIS: 15 DEGREES
EKG P-R INTERVAL: 124 MS
EKG Q-T INTERVAL: 362 MS
EKG QRS DURATION: 86 MS
EKG QTC CALCULATION (BAZETT): 442 MS
EKG R AXIS: -40 DEGREES
EKG T AXIS: 32 DEGREES
EKG VENTRICULAR RATE: 90 BPM
EOSINOPHILS ABSOLUTE: 0 E9/L (ref 0.05–0.5)
EOSINOPHILS RELATIVE PERCENT: 0 % (ref 0–6)
FERRITIN: 1896 NG/ML
GFR AFRICAN AMERICAN: 20
GFR AFRICAN AMERICAN: 20
GFR AFRICAN AMERICAN: 21
GFR AFRICAN AMERICAN: 22
GFR AFRICAN AMERICAN: 22
GFR NON-AFRICAN AMERICAN: 17 ML/MIN/1.73
GFR NON-AFRICAN AMERICAN: 18 ML/MIN/1.73
GFR NON-AFRICAN AMERICAN: 18 ML/MIN/1.73
GLUCOSE BLD-MCNC: 104 MG/DL (ref 74–99)
GLUCOSE BLD-MCNC: 117 MG/DL (ref 74–99)
GLUCOSE BLD-MCNC: 132 MG/DL (ref 74–99)
GLUCOSE BLD-MCNC: 136 MG/DL (ref 74–99)
GLUCOSE BLD-MCNC: 79 MG/DL (ref 74–99)
HCT VFR BLD CALC: 31.7 % (ref 37–54)
HEMOGLOBIN: 9.4 G/DL (ref 12.5–16.5)
IMMATURE GRANULOCYTES #: 0.01 E9/L
IMMATURE GRANULOCYTES %: 0.3 % (ref 0–5)
IRON SATURATION: 16 % (ref 20–55)
IRON: 32 MCG/DL (ref 59–158)
LYMPHOCYTES ABSOLUTE: 0.61 E9/L (ref 1.5–4)
LYMPHOCYTES RELATIVE PERCENT: 19.5 % (ref 20–42)
MAGNESIUM: 1.6 MG/DL (ref 1.6–2.6)
MCH RBC QN AUTO: 29.7 PG (ref 26–35)
MCHC RBC AUTO-ENTMCNC: 29.7 % (ref 32–34.5)
MCV RBC AUTO: 100 FL (ref 80–99.9)
MONOCYTES ABSOLUTE: 0.09 E9/L (ref 0.1–0.95)
MONOCYTES RELATIVE PERCENT: 2.9 % (ref 2–12)
NEUTROPHILS ABSOLUTE: 2.41 E9/L (ref 1.8–7.3)
NEUTROPHILS RELATIVE PERCENT: 77 % (ref 43–80)
PDW BLD-RTO: 18.4 FL (ref 11.5–15)
PLATELET # BLD: 219 E9/L (ref 130–450)
PMV BLD AUTO: 10.7 FL (ref 7–12)
POTASSIUM SERPL-SCNC: 5.5 MMOL/L (ref 3.5–5)
POTASSIUM SERPL-SCNC: 5.5 MMOL/L (ref 3.5–5)
POTASSIUM SERPL-SCNC: 5.6 MMOL/L (ref 3.5–5)
POTASSIUM SERPL-SCNC: 5.9 MMOL/L (ref 3.5–5)
POTASSIUM SERPL-SCNC: 6.5 MMOL/L (ref 3.5–5)
RBC # BLD: 3.17 E12/L (ref 3.8–5.8)
SODIUM BLD-SCNC: 135 MMOL/L (ref 132–146)
SODIUM BLD-SCNC: 137 MMOL/L (ref 132–146)
SODIUM BLD-SCNC: 138 MMOL/L (ref 132–146)
SODIUM BLD-SCNC: 139 MMOL/L (ref 132–146)
SODIUM BLD-SCNC: 141 MMOL/L (ref 132–146)
TOTAL IRON BINDING CAPACITY: 196 MCG/DL (ref 250–450)
WBC # BLD: 3.1 E9/L (ref 4.5–11.5)

## 2021-01-16 PROCEDURE — 83550 IRON BINDING TEST: CPT

## 2021-01-16 PROCEDURE — 74176 CT ABD & PELVIS W/O CONTRAST: CPT

## 2021-01-16 PROCEDURE — 80048 BASIC METABOLIC PNL TOTAL CA: CPT

## 2021-01-16 PROCEDURE — 6370000000 HC RX 637 (ALT 250 FOR IP): Performed by: INTERNAL MEDICINE

## 2021-01-16 PROCEDURE — 36415 COLL VENOUS BLD VENIPUNCTURE: CPT

## 2021-01-16 PROCEDURE — 6360000002 HC RX W HCPCS: Performed by: STUDENT IN AN ORGANIZED HEALTH CARE EDUCATION/TRAINING PROGRAM

## 2021-01-16 PROCEDURE — 2500000003 HC RX 250 WO HCPCS: Performed by: INTERNAL MEDICINE

## 2021-01-16 PROCEDURE — 2580000003 HC RX 258: Performed by: NURSE PRACTITIONER

## 2021-01-16 PROCEDURE — 83735 ASSAY OF MAGNESIUM: CPT

## 2021-01-16 PROCEDURE — 83540 ASSAY OF IRON: CPT

## 2021-01-16 PROCEDURE — 6360000002 HC RX W HCPCS: Performed by: NURSE PRACTITIONER

## 2021-01-16 PROCEDURE — 51702 INSERT TEMP BLADDER CATH: CPT

## 2021-01-16 PROCEDURE — 85025 COMPLETE CBC W/AUTO DIFF WBC: CPT

## 2021-01-16 PROCEDURE — 2580000003 HC RX 258: Performed by: INTERNAL MEDICINE

## 2021-01-16 PROCEDURE — 82728 ASSAY OF FERRITIN: CPT

## 2021-01-16 PROCEDURE — 99233 SBSQ HOSP IP/OBS HIGH 50: CPT | Performed by: INTERNAL MEDICINE

## 2021-01-16 PROCEDURE — 1200000000 HC SEMI PRIVATE

## 2021-01-16 PROCEDURE — 6370000000 HC RX 637 (ALT 250 FOR IP): Performed by: STUDENT IN AN ORGANIZED HEALTH CARE EDUCATION/TRAINING PROGRAM

## 2021-01-16 PROCEDURE — 6370000000 HC RX 637 (ALT 250 FOR IP): Performed by: NURSE PRACTITIONER

## 2021-01-16 PROCEDURE — 2580000003 HC RX 258: Performed by: STUDENT IN AN ORGANIZED HEALTH CARE EDUCATION/TRAINING PROGRAM

## 2021-01-16 RX ORDER — SODIUM POLYSTYRENE SULFONATE 15 G/60ML
30 SUSPENSION ORAL; RECTAL ONCE
Status: COMPLETED | OUTPATIENT
Start: 2021-01-16 | End: 2021-01-16

## 2021-01-16 RX ORDER — DEXTROSE MONOHYDRATE 25 G/50ML
50 INJECTION, SOLUTION INTRAVENOUS ONCE
Status: COMPLETED | OUTPATIENT
Start: 2021-01-16 | End: 2021-01-16

## 2021-01-16 RX ORDER — LOPERAMIDE HYDROCHLORIDE 2 MG/1
2 CAPSULE ORAL ONCE
Status: COMPLETED | OUTPATIENT
Start: 2021-01-16 | End: 2021-01-16

## 2021-01-16 RX ORDER — SODIUM BICARBONATE 650 MG/1
1300 TABLET ORAL 3 TIMES DAILY
Status: DISCONTINUED | OUTPATIENT
Start: 2021-01-16 | End: 2021-01-18 | Stop reason: SDUPTHER

## 2021-01-16 RX ADMIN — SODIUM BICARBONATE 650 MG TABLET 1300 MG: at 22:10

## 2021-01-16 RX ADMIN — LOPERAMIDE HYDROCHLORIDE 2 MG: 2 CAPSULE ORAL at 22:10

## 2021-01-16 RX ADMIN — TAMSULOSIN HYDROCHLORIDE 0.4 MG: 0.4 CAPSULE ORAL at 22:10

## 2021-01-16 RX ADMIN — SODIUM ZIRCONIUM CYCLOSILICATE 5 G: 5 POWDER, FOR SUSPENSION ORAL at 23:37

## 2021-01-16 RX ADMIN — SODIUM BICARBONATE: 84 INJECTION, SOLUTION INTRAVENOUS at 23:37

## 2021-01-16 RX ADMIN — DEXTROSE MONOHYDRATE 50 ML: 25 INJECTION, SOLUTION INTRAVENOUS at 09:08

## 2021-01-16 RX ADMIN — HEPARIN SODIUM 5000 UNITS: 5000 INJECTION INTRAVENOUS; SUBCUTANEOUS at 05:45

## 2021-01-16 RX ADMIN — CARVEDILOL 12.5 MG: 6.25 TABLET, FILM COATED ORAL at 17:36

## 2021-01-16 RX ADMIN — SODIUM BICARBONATE 650 MG TABLET 1300 MG: at 14:29

## 2021-01-16 RX ADMIN — SODIUM BICARBONATE: 84 INJECTION, SOLUTION INTRAVENOUS at 14:29

## 2021-01-16 RX ADMIN — DEXAMETHASONE SODIUM PHOSPHATE 4 MG: 4 INJECTION, SOLUTION INTRAMUSCULAR; INTRAVENOUS at 17:36

## 2021-01-16 RX ADMIN — ATORVASTATIN CALCIUM 20 MG: 20 TABLET, FILM COATED ORAL at 09:08

## 2021-01-16 RX ADMIN — HEPARIN SODIUM 5000 UNITS: 5000 INJECTION INTRAVENOUS; SUBCUTANEOUS at 14:29

## 2021-01-16 RX ADMIN — SODIUM POLYSTYRENE SULFONATE 30 G: 15 SUSPENSION ORAL; RECTAL at 09:08

## 2021-01-16 RX ADMIN — CALCIUM GLUCONATE 1 G: 98 INJECTION, SOLUTION INTRAVENOUS at 09:08

## 2021-01-16 RX ADMIN — ASPIRIN 81 MG: 81 TABLET, COATED ORAL at 09:08

## 2021-01-16 RX ADMIN — CARVEDILOL 12.5 MG: 6.25 TABLET, FILM COATED ORAL at 09:08

## 2021-01-16 RX ADMIN — INSULIN HUMAN 10 UNITS: 100 INJECTION, SOLUTION PARENTERAL at 09:09

## 2021-01-16 RX ADMIN — GABAPENTIN 600 MG: 300 CAPSULE ORAL at 22:10

## 2021-01-16 RX ADMIN — HEPARIN SODIUM 5000 UNITS: 5000 INJECTION INTRAVENOUS; SUBCUTANEOUS at 22:11

## 2021-01-16 RX ADMIN — SODIUM CHLORIDE: 9 INJECTION, SOLUTION INTRAVENOUS at 05:49

## 2021-01-16 RX ADMIN — GABAPENTIN 600 MG: 300 CAPSULE ORAL at 09:08

## 2021-01-16 NOTE — H&P
[address] [place], [place] [address]ist Group   History and Physical      CHIEF COMPLAINT:  Abnormal laboratory studies     History of Present Illness:  70 y.o. male with a history of squamous cell carcinoma LLL diagnosed in 2017 sp thoracotomy and resection with subsequent chemotherapy and radiation, history of CABG 2009, HTN, HLD, BPH and chronic normocytic anemia presents from the [facility] for abnormal labs. Patient is currently seeing [facility] for Renal masses that were found on surveillance CT scan of the abdomen. After his surgery in 2017 he had some time where he was cancer free but per patient he recently found out that he likely has metastasis to his kidneys. He states he has lost about 20 pounds over the last few months. He states that his appetite is \"not what it used to be\". He denies any difficulty swallowing solids or liquids. Denies fevers night sweats or chills. He feels well and has no major complaints at this time. He states he has not been urinating as much as he normally does the passed few days in volume but that he still has to go to the bathroom to pee quite frequently. He went to the bathroom to urinate twice during my interview. He has not had COVID-19 infection in the past or recent past to his knowledge and he denies any known exposure to + contacts. He is a very kind gentleman and answers all questions appropriately. He walks with a cane at baseline and is up around walking during my interview. He expresses wishes to be a full code. Current status and plan of care discussed with patient in detail. Medications reviewed and reconciled. Informant(s) for H&P:Patient and chart review     REVIEW OF SYSTEMS:  no fevers, chills, cp, sob, n/v, ha, vision/hearing changes, wt changes, hot/cold flashes, other open skin lesions, diarrhea, constipation, dysuria/hematuria unless noted in HPI. Complete ROS performed with the patient and is otherwise negative.       PMH: Past Medical History:   Diagnosis Date    Heart attack [facility]) 2009    Hypertension 2009    Lung cancer [facility]) 2017    Renal mass        Surgical History:  Past Surgical History:   Procedure Laterality Date    APPENDECTOMY  1965    LOBECTOMY Left 05/2017       Medications Prior to Admission:    Prior to Admission medications    Medication Sig Start Date End Date Taking? Authorizing Provider   Umeclidinium Bromide (INCRUSE ELLIPTA) 62.5 MCG/INH AEPB Inhale 1 puff into the lungs daily   Yes Historical Provider, MD   calcium carbonate (OSCAL) 500 MG TABS tablet Take 500 mg by mouth daily   Yes Historical Provider, MD   Magnesium 400 MG TABS Take 400 mg by mouth daily   Yes Historical Provider, MD   zinc 50 MG TABS tablet Take 50 mg by mouth daily   Yes Historical Provider, MD   vitamin B-12 (CYANOCOBALAMIN) 1000 MCG tablet Take 1,000 mcg by mouth daily   Yes Historical Provider, MD   Multiple Vitamins-Minerals (THERAPEUTIC MULTIVITAMIN-MINERALS) tablet Take 1 tablet by mouth daily   Yes Historical Provider, MD   gabapentin (NEURONTIN) 300 MG capsule Take 600 mg by mouth 2 times daily. Yes Historical Provider, MD   diphenhydrAMINE (BENADRYL) 25 MG tablet Take 50 mg by mouth daily   Yes Historical Provider, MD   tamsulosin (FLOMAX) 0.4 MG capsule Take 0.4 mg by mouth daily   Yes Historical Provider, MD   acetaminophen (TYLENOL) 500 MG tablet Take 1,000 mg by mouth every 6 hours as needed for Pain   Yes Historical Provider, MD   dexamethasone (DECADRON) 4 MG tablet Take 8 mg by mouth See Admin Instructions Take 2 tables BID, day before, the day of, and the day after chemotherapy.    Yes Historical Provider, MD   enalapril (VASOTEC) 20 MG tablet Take 10 mg by mouth daily   Yes Historical Provider, MD   aspirin 81 MG tablet Take 81 mg by mouth daily   Yes Historical Provider, MD   clopidogrel (PLAVIX) 75 MG tablet Take 75 mg by mouth daily   Yes Historical Provider, MD carvedilol (COREG) 25 MG tablet Take 12.5 mg by mouth daily    Yes Historical Provider, MD   atorvastatin (LIPITOR) 40 MG tablet Take 60 mg by mouth daily    Yes Historical Provider, MD   ALPRAZolam (XANAX) 0.5 MG tablet Take 0.5 mg by mouth 3 times daily as needed for Anxiety. Yes Historical Provider, MD       Allergies:    Patient has no known allergies. Social History:    reports that he quit smoking about 11 years ago. His smoking use included cigarettes. He has a 40.00 pack-year smoking history. He has never used smokeless tobacco. He reports that he does not drink alcohol or use drugs. Family History:   family history includes Cancer (age of onset: 71) in his father.     PHYSICAL EXAM:  Vitals:  BP (!) 140/94   Pulse 100   Temp 97.5 °F (36.4 °C) (Oral)   Resp 18   Ht 5' 7\" (1.702 m)   Wt 172 lb (78 kg)   SpO2 99%   BMI 26.94 kg/m²   General Appearance: alert and oriented to person, place and time and in no acute distress, walks with a cane  Skin: warm and dry  Head: normocephalic and atraumatic  Eyes: pupils equal, round, and reactive to light, extraocular eye movements intact, conjunctivae normal  Oral mucosa: Appears dry  Neck: neck supple and non tender without mass   Pulmonary/Chest: clear to auscultation bilaterally- no wheezes, rales or rhonchi, normal air movement, no respiratory distress  Cardiovascular: normal rate, normal S1 and S2 and no carotid bruits  Abdomen: soft, non-tender, non-distended, normal bowel sounds, no masses or organomegaly  Extremities: Lower extremity BL pitting edema up to the shins +2  Neurologic: no cranial nerve deficit and speech normal    LABS:  Recent Labs     01/15/21  1050      K 5.6*   *   CO2 15*   BUN 42*   CREATININE 3.5*   GLUCOSE 98   CALCIUM 8.8       Recent Labs     01/15/21  1050   WBC 5.8   RBC 3.29*   HGB 9.9*   HCT 32.5*   MCV 98.8   MCH 30.1   MCHC 30.5*   RDW 18.3*      MPV 10.2 No results for input(s): POCGLU in the last 72 hours. CBC:   Lab Results   Component Value Date    WBC 5.8 01/15/2021    RBC 3.29 01/15/2021    HGB 9.9 01/15/2021    HCT 32.5 01/15/2021    MCV 98.8 01/15/2021    MCH 30.1 01/15/2021    MCHC 30.5 01/15/2021    RDW 18.3 01/15/2021     01/15/2021    MPV 10.2 01/15/2021     CMP:    Lab Results   Component Value Date     01/15/2021    K 5.6 01/15/2021     01/15/2021    CO2 15 01/15/2021    BUN 42 01/15/2021    CREATININE 3.5 01/15/2021    GFRAA 21 01/15/2021    LABGLOM 17 01/15/2021    GLUCOSE 98 01/15/2021    PROT 6.2 01/15/2021    LABALBU 3.3 01/15/2021    CALCIUM 8.8 01/15/2021    BILITOT 0.3 01/15/2021    ALKPHOS 68 01/15/2021    AST 13 01/15/2021    ALT 7 01/15/2021     Last 3 Troponin:    Lab Results   Component Value Date    TROPONINI <0.01 01/15/2021       Radiology: No results found. EKG:     ASSESSMENT:      Principal Problem:    Acute kidney injury superimposed on CKD ([facility] [address]  Active Problems:    BRITNI (acute kidney injury) ([facility] [address]    Hyperkalemia    Renal cancer (HCC)    Coronary artery disease involving native coronary artery of native heart without angina pectoris    Hx of coronary artery bypass graft    Squamous cell carcinoma of bronchus in left lower lobe (HCC)    BPH (benign prostatic hyperplasia)    Benign essential HTN    Normocytic anemia  Resolved Problems:    * No resolved hospital problems. *      PLAN:    1. Acute Kidney Failure -- Baseline Cr 1.10 in 2019 SP 1 L IVF in the ER. We will give some gentle IV hydration to the patient with 0.9 NS @ 100 cc/hr as the patient does appear dry on exam. Monitor and examine in the AM for signs and symptoms of fluid overload, I was unable to find echocardiographic information per my chart review at this time.  Nephrology consult has been placed, we will check BMPs Q8h for the next 24 hr 2. Hyperkalemia - K 5.6 at 10:30 AM, we will re-check a BMP now at 730 pm and follow up. The EKG does not show any signs of hyperkalemia and the patient is stable hemodynamically. Follow up Q8h BMP for the next 24 hr     3. Abnormal EKG - No prior EKG for reference, 1 lead showing >2mm st elevation - by definition not a STEMI. Initial troponin negative, patient asymptomatic, will check cardiac enzymes including CK as the patient is in renal failure and ensure there is no cardiac pathology occurring     4. Renal cancer - Following with [facility], he is unable to tell me what if any chemotherapy he is currently on. His wife states he is on Dexamethasone 4mg PO at home, we will continue this medication for him     5. Normocytic anemia - Chronic anemia with no signs of overt bleeding. Baseline Hb 10.8 from 2019 -- we will check iron panel in the AM     6. BPH - Continue at home Flomax qhs with holding parameters for BP     7. Essential HTN - Continue home Carvedilol with parameters - monitor BP     8. History of CABG - Continue ASA     Code Status: Full Code   DVT prophylaxis: Heparin SC     NOTE: This report was transcribed using voice recognition software.  Every effort was made to ensure accuracy; however, inadvertent computerized transcription errors may be present.     Electronically signed by Dieter Magallanes MD on 1/15/2021 at 7:10 PM

## 2021-01-16 NOTE — CONSULTS
1/16/2021 11:07 AM  Service: Urology  Group: [facility] ([facility])    Brad Joseph  [id number]     Chief Complaint:    BRITNI    History of Present Illness: The patient is a 70 y.o. male patient who presents with BRITNI on CKD      He was seen in our office by Dr. Lawson Henriquez in 2019. He was referred by Oncology (hx of lung CA) for bilateral renal lesions after a  CT done [facility] showed 5.4 right renal lesion, and 2.1 left renal lesion. [facility] nephrectomies. He was referred to Dr. Harsha Merino. He underwent cystolitholapaxy and urethral dilation in August 2019 at [facility]. He had a biopsy of left upper renal mass in August 2019. This showed invasive squamous cell carcinoma, moderately differentiated and was advised to follow with his Oncologist.   He has not had other surgeries on his kidneys at this time. He does follow with oncology for metastatic lung cancer. Past Medical History:   Diagnosis Date    Heart attack [facility]) 2009    Hypertension 2009    Lung cancer [facility]) 2017    Renal mass          Past Surgical History:   Procedure Laterality Date    APPENDECTOMY  1965    LOBECTOMY Left 05/2017       Medications Prior to Admission:    Medications Prior to Admission: Umeclidinium Bromide (INCRUSE ELLIPTA) 62.5 MCG/INH AEPB, Inhale 1 puff into the lungs daily  calcium carbonate (OSCAL) 500 MG TABS tablet, Take 500 mg by mouth daily  Magnesium 400 MG TABS, Take 400 mg by mouth daily  zinc 50 MG TABS tablet, Take 50 mg by mouth daily  vitamin B-12 (CYANOCOBALAMIN) 1000 MCG tablet, Take 1,000 mcg by mouth daily  Multiple Vitamins-Minerals (THERAPEUTIC MULTIVITAMIN-MINERALS) tablet, Take 1 tablet by mouth daily  gabapentin (NEURONTIN) 300 MG capsule, Take 600 mg by mouth 2 times daily.   diphenhydrAMINE (BENADRYL) 25 MG tablet, Take 50 mg by mouth daily  tamsulosin (FLOMAX) 0.4 MG capsule, Take 0.4 mg by mouth daily acetaminophen (TYLENOL) 500 MG tablet, Take 1,000 mg by mouth every 6 hours as needed for Pain  dexamethasone (DECADRON) 4 MG tablet, Take 8 mg by mouth See Admin Instructions Take 2 tables BID, day before, the day of, and the day after chemotherapy. enalapril (VASOTEC) 20 MG tablet, Take 10 mg by mouth daily  aspirin 81 MG tablet, Take 81 mg by mouth daily  clopidogrel (PLAVIX) 75 MG tablet, Take 75 mg by mouth daily  carvedilol (COREG) 25 MG tablet, Take 12.5 mg by mouth daily   atorvastatin (LIPITOR) 40 MG tablet, Take 60 mg by mouth daily   ALPRAZolam (XANAX) 0.5 MG tablet, Take 0.5 mg by mouth 3 times daily as needed for Anxiety. Allergies:    Patient has no known allergies. Social History:    reports that he quit smoking about 11 years ago. His smoking use included cigarettes. He has a 40.00 pack-year smoking history. He has never used smokeless tobacco. He reports that he does not drink alcohol or use drugs. Family History:   Non-contributory to this Urological problem  family history includes Cancer (age of onset: 71) in his father. Review of Systems:  Constitutional: No fever or chills   Respiratory: negative for cough and hemoptysis  Cardiovascular: negative for chest pain and dyspnea  Gastrointestinal: negative for abdominal pain, diarrhea, nausea and vomiting   Derm: negative for rash and skin lesion(s)  Neurological: negative for seizures and tremors  Musculoskeletal: Negative    Psychiatric: Negative   : As above in the HPI, otherwise negative  All other reviews are negative    Physical Exam:     Vitals:  /86   Pulse 96   Temp 98.1 °F (36.7 °C) (Oral)   Resp 18   Ht 5' 7\" (1.702 m)   Wt 172 lb (78 kg)   SpO2 97%   BMI 26.94 kg/m²     General:  Awake, alert, oriented X 3. No apparent distress. HEENT:  Normocephalic, atraumatic. Lungs:  Respirations symmetric and non-labored.   Abdomen:  soft, nontender, no masses  Extremities:  No clubbing, cyanosis, or edema Skin:  Warm and dry, no open lesions or rashes  Neuro: There are no motor or sensory deficits in the 4 quadrant extremities   Rectal: deferred  Genitourinary:  deferred    Labs:     Recent Labs     01/15/21  1050 01/16/21  0445   WBC 5.8 3.1*   RBC 3.29* 3.17*   HGB 9.9* 9.4*   HCT 32.5* 31.7*   MCV 98.8 100.0*   MCH 30.1 29.7   MCHC 30.5* 29.7*   RDW 18.3* 18.4*    219   MPV 10.2 10.7         Recent Labs     01/15/21  1050 01/15/21  1927 01/16/21  0445   CREATININE 3.5* 3.5* 3.5*       Imaging:   Impression   Moderate left and minimal right hydronephrosis, no sign of obstructing   calculus or mass, suspect bladder outlet obstruction   Small pleural effusions, may minimal free intraperitoneal fluid. Left lower lobe atelectasis         Assessment/plan:      69 y/o M with mild right hydro and mild left hdyronnephrosis. I do not see any hydroureter that would be in line with bladder outlet obstruction. The patient has follow with [facility] for his urologic care of most recent. He denies any flank pain. No Voiding issues. Pt is receiving chemotherapy but I do not have records of such. I do not know what his baseline CR is to compare at this time. Will monitor cr. Could obtain a renal lasix scan if obstruction is considered but I do not feel it is necessary at this time. Will follow.      Elliot Leigh MD

## 2021-01-16 NOTE — PROGRESS NOTES
[address] [place], [place] [address]ist   Progress Note    Admitting Date and Time: 1/15/2021 10:02 AM  Admit Dx: BRITNI (acute kidney injury) ([facility] [address] [N17.9]    Subjective:    Pt feels okay but not enthused about Garcia catheter. Per RN:  Patient with worse hyperkalemia this morning treated with calcium gluconate,  R insulin, D50 and Kayexalate 30g.      sodium bicarbonate  1,300 mg Oral TID    sodium chloride (PF)  0.9 mL Intravenous Once    aspirin  81 mg Oral Daily    atorvastatin  20 mg Oral Daily    carvedilol  12.5 mg Oral BID WC    dexamethasone  4 mg Oral Q24H    tamsulosin  0.4 mg Oral Nightly    gabapentin  600 mg Oral BID    sodium chloride flush  10 mL Intravenous 2 times per day    heparin (porcine)  5,000 Units Subcutaneous 3 times per day         ALPRAZolam, 0.5 mg, PRN      calcium carbonate, 1 tablet, Daily PRN      sodium chloride flush, 10 mL, PRN      promethazine, 12.5 mg, Q6H PRN    Or      ondansetron, 4 mg, Q6H PRN      polyethylene glycol, 17 g, Daily PRN      acetaminophen, 650 mg, Q6H PRN    Or      acetaminophen, 650 mg, Q6H PRN         Objective:    /86   Pulse 96   Temp 98.1 °F (36.7 °C) (Oral)   Resp 18   Ht 5' 7\" (1.702 m)   Wt 172 lb (78 kg)   SpO2 97%   BMI 26.94 kg/m²   General Appearance: alert and oriented to person, place and time and in no acute distress  Skin: warm and dry  Head: normocephalic and atraumatic  Eyes: pupils equal, round, and reactive to light, extraocular eye movements intact, conjunctivae normal  Neck: neck supple and non tender without mass   Pulmonary/Chest: clear to auscultation bilaterally- no wheezes, rales or rhonchi, normal air movement, no respiratory distress  Cardiovascular: normal rate, normal S1 and S2 and no carotid bruits  Abdomen: soft, non-tender, non-distended, normal bowel sounds, no masses or organomegaly  Extremities: no cyanosis, no clubbing and no edema  Neurologic: no cranial nerve deficit and speech normal Recent Labs     01/16/21  0445 01/16/21  1026 01/16/21  1200    138 141   K 6.5* 5.6* 5.9*   * 107 108*   CO2 17* 17* 17*   BUN 41* 41* 41*   CREATININE 3.5* 3.6* 3.6*   GLUCOSE 117* 136* 79   CALCIUM 9.0 9.2 9.6       Recent Labs     01/15/21  1050   ALKPHOS 68   PROT 6.2*   LABALBU 3.3*   BILITOT 0.3   AST 13   ALT 7       Recent Labs     01/15/21  1050 01/16/21  0445   WBC 5.8 3.1*   RBC 3.29* 3.17*   HGB 9.9* 9.4*   HCT 32.5* 31.7*   MCV 98.8 100.0*   MCH 30.1 29.7   MCHC 30.5* 29.7*   RDW 18.3* 18.4*    219   MPV 10.2 10.7       Iron and TIBC [[id number]] (Abnormal) Collected: 01/16/21 0444     Specimen: Blood Updated: 01/16/21 1139      Iron 32 mcg/dL       TIBC 196 mcg/dL       Iron Saturation 16 %      Ferritin [[id number]] Collected: 01/16/21 0444     Specimen: Blood Updated: 01/16/21 1139      Ferritin 1,896 ng/mL          Radiology:   CT ABDOMEN PELVIS WO CONTRAST   Final Result   Moderate left and minimal right hydronephrosis, no sign of obstructing   calculus or mass, suspect bladder outlet obstruction   Small pleural effusions, may minimal free intraperitoneal fluid. Left lower lobe atelectasis      US RETROPERITONEAL COMPLETE   Final Result   Right kidney is mildly atrophic. Mild hydronephrosis of left kidney. Bladder is unremarkable. Left urinary jet is not visualized          Assessment:  Principal Problem:    Acute kidney injury superimposed on CKD (HCC)  Active Problems:    Hyperkalemia    Squamous cell carcinoma of bronchus in left lower lobe (HCC)    BRITNI (acute kidney injury) (HCC)    BPH (benign prostatic hyperplasia)    Coronary artery disease involving native coronary artery of native heart without angina pectoris    Hx of coronary artery bypass graft    Renal cancer (HCC)    Benign essential HTN    Normocytic anemia    Acute kidney injury ([address]  Resolved Problems:    * No resolved hospital problems.  *      Plan: 1. Acute kidney injury--baseline creatinine about 1.1 back in 2009 per the [facility] [facility] clinic records. She is actively getting chemotherapy is unclear if that treatment could be contributing towards his acute kidney injury. [facility] consulted (pending) for further input. Meanwhile, place on IV fluids. Nephrology consulted and following. IVF changed to bicarb drip. Urine studies sent. Renal US showed left hydronephrosis which prompted CT abd/pelvis moderate left hydronephrosis. Urology consulted by nephrology but they do not feel he has any hydroureter to warrant intervention. 2. Hyperkalemia--potassium 5.6 on admission. Patient was given IV fluids repeat BMP done this evening shows improvement of potassium to 5.1. In the ER patient did receive treatment with calcium gluconate D50 and insulin. Repeat K this morning even higher at 6.5 today. He underwent treatment as noted above. 3. Abnormal EKG--asymptomatic. He has mild J-point elevation. Initial troponin negative in the ER. Repeat troponin negative. 4. BPH--continue Flomax. Check PVR as noted above  5. Anemia--could be from chemotherapy. Iron profile c/w iron deficiency. 6. Hypertension with prior history of CABG--continue aspirin, BB and statin    Case discussed with Dr. Shawn Pisano of nephrology earlier today. NOTE: This report was transcribed using voice recognition software. Every effort was made to ensure accuracy; however, inadvertent computerized transcription errors may be present.      Electronically signed by Luke Godoy MD on 1/16/2021 at 3:27 PM

## 2021-01-16 NOTE — PROGRESS NOTES
Subjective: The patient is awake and alert. No problems overnight. Has improvement in BLE swelling. Denies chest pain, angina, dyspnea or abdominal discomfort. No nausea or vomiting. Tolerating diet. Has stable chronic neuropathy. Objective:    /86   Pulse 96   Temp 98.1 °F (36.7 °C) (Oral)   Resp 18   Ht 5' 7\" (1.702 m)   Wt 172 lb (78 kg)   SpO2 97%   BMI 26.94 kg/m²     General: NAD  HEENT: No thrush or mucositis, EOMI, PERRLA  Heart:  RRR, no murmurs, gallops, or rubs. Lungs:  CTA bilaterally, no wheeze, rales or rhonchi  Abd: BS present, nontender, nondistended, no masses  Extrem:  No clubbing, cyanosis.   Some BLE edema  Lymphatics: No palpable adenopathy in cervical and supraclavicular regions  Skin: Intact, no petechia or purpura    CBC with Differential:    Lab Results   Component Value Date    WBC 3.1 01/16/2021    RBC 3.17 01/16/2021    HGB 9.4 01/16/2021    HCT 31.7 01/16/2021     01/16/2021    .0 01/16/2021    MCH 29.7 01/16/2021    MCHC 29.7 01/16/2021    RDW 18.4 01/16/2021    LYMPHOPCT 19.5 01/16/2021    MONOPCT 2.9 01/16/2021    BASOPCT 0.3 01/16/2021    MONOSABS 0.09 01/16/2021    LYMPHSABS 0.61 01/16/2021    EOSABS 0.00 01/16/2021    BASOSABS 0.01 01/16/2021     CMP:    Lab Results   Component Value Date     01/16/2021    K 5.9 01/16/2021    K 5.6 01/15/2021     01/16/2021    CO2 17 01/16/2021    BUN 41 01/16/2021    CREATININE 3.6 01/16/2021    GFRAA 20 01/16/2021    LABGLOM 17 01/16/2021    GLUCOSE 79 01/16/2021    PROT 6.2 01/15/2021    LABALBU 3.3 01/15/2021    CALCIUM 9.6 01/16/2021    BILITOT 0.3 01/15/2021    ALKPHOS 68 01/15/2021    AST 13 01/15/2021    ALT 7 01/15/2021      CYSD:[id number]}     Current Facility-Administered Medications:     sodium bicarbonate tablet 1,300 mg, 1,300 mg, Oral, TID, Rolin Dopp, APRN - NP, 1,300 mg at 01/16/21 1166   sodium bicarbonate 150 mEq in dextrose 5 % 1,000 mL infusion, , Intravenous, Continuous, Zack Friday, MD, Last Rate: 100 mL/hr at 01/16/21 1429, New Bag at 01/16/21 1429    [COMPLETED] hyaluronidase human (HYLENEX) injection 15 Units, 15 Units, Subcutaneous, Once, 15 Units at 01/15/21 1300 **AND** sodium chloride (PF) 0.9 % injection 0.9 mL, 0.9 mL, Intravenous, Once, Em Rome MD    ALPRAZolam Carlota Sherwood) tablet 0.5 mg, 0.5 mg, Oral, PRN, Eriberto Monahan MD    aspirin EC tablet 81 mg, 81 mg, Oral, Daily, Eriberto Monahan MD, 81 mg at 01/16/21 0908    atorvastatin (LIPITOR) tablet 20 mg, 20 mg, Oral, Daily, Eriberto Monahan MD, 20 mg at 01/16/21 0908    calcium carbonate (TUMS) chewable tablet 500 mg, 1 tablet, Oral, Daily PRN, Eriberto Monahan MD    carvedilol (COREG) tablet 12.5 mg, 12.5 mg, Oral, BID WC, Eriberto Monahan MD, 12.5 mg at 01/16/21 0908    dexamethasone (DECADRON) injection 4 mg, 4 mg, Oral, Q24H, Eriberto Monahan MD, 4 mg at 01/15/21 1855    tamsulosin [facility]) capsule 0.4 mg, 0.4 mg, Oral, Nightly, Eriberto Monahan MD, 0.4 mg at 01/15/21 2046    gabapentin (NEURONTIN) capsule 600 mg, 600 mg, Oral, BID, Eriberto Monahan MD, 600 mg at 01/16/21 0908    sodium chloride flush 0.9 % injection 10 mL, 10 mL, Intravenous, 2 times per day, Eriberto Monahan MD, 10 mL at 01/15/21 2046    sodium chloride flush 0.9 % injection 10 mL, 10 mL, Intravenous, PRN, Eriberto Monahan MD    promethazine (PHENERGAN) tablet 12.5 mg, 12.5 mg, Oral, Q6H PRN **OR** ondansetron (ZOFRAN) injection 4 mg, 4 mg, Intravenous, Q6H PRN, Eriberto Monahan MD    polyethylene glycol (GLYCOLAX) packet 17 g, 17 g, Oral, Daily PRN, Eriberto Monahan MD    acetaminophen (TYLENOL) tablet 650 mg, 650 mg, Oral, Q6H PRN **OR** acetaminophen (TYLENOL) suppository 650 mg, 650 mg, Rectal, Q6H PRN, Eriberto Monahan MD    heparin (porcine) injection 5,000 Units, 5,000 Units, Subcutaneous, 3 times per day, Eriberto Monahan MD, 5,000 Units at 01/16/21 [id number] 40 37 31 Leukopenia/anemia secondary to antineoplastic chemotherapy. Does not require G-CSF support or transfusion. Monitor CBC w diff and CMP daily. BRITNI persists despite outpatient IV fluids that I tried giving her last week. Potassium improving with tx. Baseline Cr was around 1.3 in Nov-December. Discussed case with Dr. Sofia Choi.        Electronically signed by Amy Garcia MD on 1/16/2021 at 3:43 PM

## 2021-01-17 LAB
ANION GAP SERPL CALCULATED.3IONS-SCNC: 9 MMOL/L (ref 7–16)
ANION GAP SERPL CALCULATED.3IONS-SCNC: 9 MMOL/L (ref 7–16)
BASOPHILS ABSOLUTE: 0.01 E9/L (ref 0–0.2)
BASOPHILS RELATIVE PERCENT: 0.2 % (ref 0–2)
BUN BLDV-MCNC: 40 MG/DL (ref 8–23)
BUN BLDV-MCNC: 40 MG/DL (ref 8–23)
CALCIUM SERPL-MCNC: 8.3 MG/DL (ref 8.6–10.2)
CALCIUM SERPL-MCNC: 8.5 MG/DL (ref 8.6–10.2)
CHLORIDE BLD-SCNC: 100 MMOL/L (ref 98–107)
CHLORIDE BLD-SCNC: 103 MMOL/L (ref 98–107)
CO2: 23 MMOL/L (ref 22–29)
CO2: 26 MMOL/L (ref 22–29)
CREAT SERPL-MCNC: 3.3 MG/DL (ref 0.7–1.2)
CREAT SERPL-MCNC: 3.4 MG/DL (ref 0.7–1.2)
EOSINOPHILS ABSOLUTE: 0 E9/L (ref 0.05–0.5)
EOSINOPHILS RELATIVE PERCENT: 0 % (ref 0–6)
GFR AFRICAN AMERICAN: 22
GFR AFRICAN AMERICAN: 22
GFR NON-AFRICAN AMERICAN: 18 ML/MIN/1.73
GFR NON-AFRICAN AMERICAN: 18 ML/MIN/1.73
GLUCOSE BLD-MCNC: 101 MG/DL (ref 74–99)
GLUCOSE BLD-MCNC: 138 MG/DL (ref 74–99)
HCT VFR BLD CALC: 28.1 % (ref 37–54)
HEMOGLOBIN: 8.5 G/DL (ref 12.5–16.5)
IMMATURE GRANULOCYTES #: 0.02 E9/L
IMMATURE GRANULOCYTES %: 0.5 % (ref 0–5)
LYMPHOCYTES ABSOLUTE: 0.71 E9/L (ref 1.5–4)
LYMPHOCYTES RELATIVE PERCENT: 17.6 % (ref 20–42)
MAGNESIUM: 1.3 MG/DL (ref 1.6–2.6)
MCH RBC QN AUTO: 29.8 PG (ref 26–35)
MCHC RBC AUTO-ENTMCNC: 30.2 % (ref 32–34.5)
MCV RBC AUTO: 98.6 FL (ref 80–99.9)
MONOCYTES ABSOLUTE: 0.26 E9/L (ref 0.1–0.95)
MONOCYTES RELATIVE PERCENT: 6.4 % (ref 2–12)
NEUTROPHILS ABSOLUTE: 3.04 E9/L (ref 1.8–7.3)
NEUTROPHILS RELATIVE PERCENT: 75.3 % (ref 43–80)
PDW BLD-RTO: 18.6 FL (ref 11.5–15)
PHOSPHORUS: 3.5 MG/DL (ref 2.5–4.5)
PLATELET # BLD: 214 E9/L (ref 130–450)
PMV BLD AUTO: 10.6 FL (ref 7–12)
POTASSIUM SERPL-SCNC: 4.5 MMOL/L (ref 3.5–5)
POTASSIUM SERPL-SCNC: 5.1 MMOL/L (ref 3.5–5)
RBC # BLD: 2.85 E12/L (ref 3.8–5.8)
SODIUM BLD-SCNC: 135 MMOL/L (ref 132–146)
SODIUM BLD-SCNC: 135 MMOL/L (ref 132–146)
WBC # BLD: 4 E9/L (ref 4.5–11.5)

## 2021-01-17 PROCEDURE — 85025 COMPLETE CBC W/AUTO DIFF WBC: CPT

## 2021-01-17 PROCEDURE — 80048 BASIC METABOLIC PNL TOTAL CA: CPT

## 2021-01-17 PROCEDURE — 2580000003 HC RX 258: Performed by: INTERNAL MEDICINE

## 2021-01-17 PROCEDURE — 6370000000 HC RX 637 (ALT 250 FOR IP): Performed by: INTERNAL MEDICINE

## 2021-01-17 PROCEDURE — 97161 PT EVAL LOW COMPLEX 20 MIN: CPT | Performed by: PHYSICAL THERAPIST

## 2021-01-17 PROCEDURE — 84100 ASSAY OF PHOSPHORUS: CPT

## 2021-01-17 PROCEDURE — 83735 ASSAY OF MAGNESIUM: CPT

## 2021-01-17 PROCEDURE — 6360000002 HC RX W HCPCS: Performed by: NURSE PRACTITIONER

## 2021-01-17 PROCEDURE — 6370000000 HC RX 637 (ALT 250 FOR IP): Performed by: NURSE PRACTITIONER

## 2021-01-17 PROCEDURE — 6360000002 HC RX W HCPCS: Performed by: STUDENT IN AN ORGANIZED HEALTH CARE EDUCATION/TRAINING PROGRAM

## 2021-01-17 PROCEDURE — 1200000000 HC SEMI PRIVATE

## 2021-01-17 PROCEDURE — 6370000000 HC RX 637 (ALT 250 FOR IP): Performed by: STUDENT IN AN ORGANIZED HEALTH CARE EDUCATION/TRAINING PROGRAM

## 2021-01-17 PROCEDURE — 99233 SBSQ HOSP IP/OBS HIGH 50: CPT | Performed by: INTERNAL MEDICINE

## 2021-01-17 PROCEDURE — 36415 COLL VENOUS BLD VENIPUNCTURE: CPT

## 2021-01-17 RX ORDER — SODIUM BICARBONATE 650 MG/1
1300 TABLET ORAL 2 TIMES DAILY
Status: DISCONTINUED | OUTPATIENT
Start: 2021-01-17 | End: 2021-01-18

## 2021-01-17 RX ORDER — SODIUM CHLORIDE 9 MG/ML
1000 INJECTION, SOLUTION INTRAVENOUS CONTINUOUS
Status: DISCONTINUED | OUTPATIENT
Start: 2021-01-17 | End: 2021-01-20 | Stop reason: HOSPADM

## 2021-01-17 RX ORDER — MAGNESIUM SULFATE IN WATER 40 MG/ML
2 INJECTION, SOLUTION INTRAVENOUS ONCE
Status: COMPLETED | OUTPATIENT
Start: 2021-01-17 | End: 2021-01-17

## 2021-01-17 RX ADMIN — HEPARIN SODIUM 5000 UNITS: 5000 INJECTION INTRAVENOUS; SUBCUTANEOUS at 13:19

## 2021-01-17 RX ADMIN — HEPARIN SODIUM 5000 UNITS: 5000 INJECTION INTRAVENOUS; SUBCUTANEOUS at 21:57

## 2021-01-17 RX ADMIN — TAMSULOSIN HYDROCHLORIDE 0.4 MG: 0.4 CAPSULE ORAL at 21:56

## 2021-01-17 RX ADMIN — HEPARIN SODIUM 5000 UNITS: 5000 INJECTION INTRAVENOUS; SUBCUTANEOUS at 05:23

## 2021-01-17 RX ADMIN — ATORVASTATIN CALCIUM 20 MG: 20 TABLET, FILM COATED ORAL at 08:04

## 2021-01-17 RX ADMIN — GABAPENTIN 600 MG: 300 CAPSULE ORAL at 21:57

## 2021-01-17 RX ADMIN — DEXAMETHASONE SODIUM PHOSPHATE 4 MG: 4 INJECTION, SOLUTION INTRAMUSCULAR; INTRAVENOUS at 17:27

## 2021-01-17 RX ADMIN — CARVEDILOL 12.5 MG: 6.25 TABLET, FILM COATED ORAL at 17:27

## 2021-01-17 RX ADMIN — SODIUM BICARBONATE 650 MG TABLET 1300 MG: at 21:57

## 2021-01-17 RX ADMIN — SODIUM BICARBONATE 650 MG TABLET 1300 MG: at 08:04

## 2021-01-17 RX ADMIN — SODIUM CHLORIDE 1000 ML: 9 INJECTION, SOLUTION INTRAVENOUS at 13:18

## 2021-01-17 RX ADMIN — MAGNESIUM SULFATE HEPTAHYDRATE 2 G: 40 INJECTION, SOLUTION INTRAVENOUS at 11:04

## 2021-01-17 RX ADMIN — ASPIRIN 81 MG: 81 TABLET, COATED ORAL at 08:04

## 2021-01-17 RX ADMIN — CARVEDILOL 12.5 MG: 6.25 TABLET, FILM COATED ORAL at 08:04

## 2021-01-17 RX ADMIN — GABAPENTIN 600 MG: 300 CAPSULE ORAL at 08:04

## 2021-01-17 RX ADMIN — SODIUM BICARBONATE 650 MG TABLET 1300 MG: at 13:18

## 2021-01-17 RX ADMIN — SODIUM ZIRCONIUM CYCLOSILICATE 5 G: 5 POWDER, FOR SUSPENSION ORAL at 05:23

## 2021-01-17 NOTE — PROGRESS NOTES
[facility]  ([facility])      PROGRESS NOTE         PATIENT NAME: Orlin Sofia   YOB: 1945  ADMISSION DATE: 1/15/2021 10:02 AM   TODAY'S DATE: 1/17/2021        Subjective       Garcia inserted yesterday. Urine yellow,. KI      Objective     VS:   /81   Pulse 96   Temp 97.8 °F (36.6 °C) (Oral)   Resp 18   Ht 5' 7\" (1.702 m)   Wt 179 lb 3.2 oz (81.3 kg)   SpO2 97%   BMI 28.07 kg/m²     I & O - 24hr:    Intake/Output Summary (Last 24 hours) at 1/17/2021 0747  Last data filed at 1/17/2021 0525  Gross per 24 hour   Intake 580 ml   Output 1450 ml   Net -870 ml         Physical Exam:  General:  Neck:  Resp:  Abdomen:   No acute distress  Supple  Normal effort  Soft, non-tender, nondistended   :  Garcia yellow   Skin: Skin color, texture, turgor normal, no rashes or lesions       Labs and Imaging Studies   Labs:    CBC:   Recent Labs     01/15/21  1050 01/16/21  0445 01/17/21  0449   WBC 5.8 3.1* 4.0*   HGB 9.9* 9.4* 8.5*   HCT 32.5* 31.7* 28.1*   MCV 98.8 100.0* 98.6    219 214     BMP:  Recent Labs     01/15/21  1927 01/15/21  1927 01/16/21  1712 01/16/21  2253 01/17/21  0449      < > 139 135 135   K 5.1*   < > 5.5* 5.5* 5.1*   *   < > 106 104 103   CO2 17*   < > 19* 20* 23   PHOS 3.3  --   --   --  3.5   BUN 39*   < > 41* 40* 40*   CREATININE 3.5*   < > 3.4* 3.3* 3.4*    < > = values in this interval not displayed. Magnesium:   Lab Results   Component Value Date    MG 1.3 01/17/2021      Phosphate:   Lab Results   Component Value Date    PHOS 3.5 01/17/2021     PT/INR: No results for input(s): PROTIME, INR in the last 72 hours.     U/A:   Lab Results   Component Value Date    LEUKOCYTESUR Negative 01/15/2021    PHUR 6.0 01/15/2021    SPECGRAV 1.015 01/15/2021    BLOODU Negative 01/15/2021    GLUCOSEU Negative 01/15/2021       Urine Culture: No results found for: LABURIN     Blood Culture:     Imaging Studies:          Assessment and Plan 69 y/o M with mild right hydro and mild left hdyronnephrosis.      I do not see any hydroureter that would be in line with bladder outlet obstruction. The patient has follow with [facility] for his urologic care of most recent. He denies any flank pain. No Voiding issues. Pt is receiving chemotherapy but I do not have records of such. I do not know what his baseline CR is to compare at this time. Will monitor cr.    obtain renal lasix scan  Will follow.      Destin Miller MD  [facility]  1/17/2021  7:47 AM

## 2021-01-17 NOTE — PROGRESS NOTES
[address] [place], [place] [address]ist   Progress Note    Admitting Date and Time: 1/15/2021 10:02 AM  Admit Dx: BRITNI (acute kidney injury) ([facility] [address] [N17.9]    Subjective:    1/16: Pt feels okay but not enthused about Garcia catheter. 1/17: Patient eager to get home as soon as possible. Per RN: Urology ordered Lasix renal scan for tomorrow.      sodium bicarbonate  1,300 mg Oral BID    sodium bicarbonate  1,300 mg Oral TID    sodium chloride (PF)  0.9 mL Intravenous Once    aspirin  81 mg Oral Daily    atorvastatin  20 mg Oral Daily    carvedilol  12.5 mg Oral BID WC    dexamethasone  4 mg Oral Q24H    tamsulosin  0.4 mg Oral Nightly    gabapentin  600 mg Oral BID    sodium chloride flush  10 mL Intravenous 2 times per day    heparin (porcine)  5,000 Units Subcutaneous 3 times per day         ALPRAZolam, 0.5 mg, PRN      calcium carbonate, 1 tablet, Daily PRN      sodium chloride flush, 10 mL, PRN      promethazine, 12.5 mg, Q6H PRN    Or      ondansetron, 4 mg, Q6H PRN      polyethylene glycol, 17 g, Daily PRN      acetaminophen, 650 mg, Q6H PRN    Or      acetaminophen, 650 mg, Q6H PRN         Objective:    /82   Pulse 80   Temp 97.8 °F (36.6 °C) (Oral)   Resp 18   Ht 5' 7\" (1.702 m)   Wt 179 lb 3.2 oz (81.3 kg)   SpO2 98%   BMI 28.07 kg/m²   General Appearance: alert and oriented to person, place and time and in no acute distress  Skin: warm and dry  Head: normocephalic and atraumatic  Eyes: pupils equal, round, and reactive to light, extraocular eye movements intact, conjunctivae normal  Neck: neck supple and non tender without mass   Pulmonary/Chest: clear to auscultation bilaterally- no wheezes, rales or rhonchi, normal air movement, no respiratory distress  Cardiovascular: normal rate, normal S1 and S2 and no carotid bruits  Abdomen: soft, non-tender, non-distended, normal bowel sounds, no masses or organomegaly  Extremities: no cyanosis, no clubbing and no edema Neurologic: no cranial nerve deficit and speech normal      Recent Labs     01/16/21  2253 01/17/21  0449 01/17/21  1116    135 135   K 5.5* 5.1* 4.5    103 100   CO2 20* 23 26   BUN 40* 40* 40*   CREATININE 3.3* 3.4* 3.3*   GLUCOSE 132* 138* 101*   CALCIUM 8.9 8.5* 8.3*       Recent Labs     01/15/21  1050   ALKPHOS 68   PROT 6.2*   LABALBU 3.3*   BILITOT 0.3   AST 13   ALT 7       Recent Labs     01/15/21  1050 01/16/21  0445 01/17/21  0449   WBC 5.8 3.1* 4.0*   RBC 3.29* 3.17* 2.85*   HGB 9.9* 9.4* 8.5*   HCT 32.5* 31.7* 28.1*   MCV 98.8 100.0* 98.6   MCH 30.1 29.7 29.8   MCHC 30.5* 29.7* 30.2*   RDW 18.3* 18.4* 18.6*    219 214   MPV 10.2 10.7 10.6       Iron and TIBC [[id number]] (Abnormal) Collected: 01/16/21 0444     Specimen: Blood Updated: 01/16/21 1139      Iron 32 mcg/dL       TIBC 196 mcg/dL       Iron Saturation 16 %      Ferritin [[id number]] Collected: 01/16/21 0444     Specimen: Blood Updated: 01/16/21 1139      Ferritin 1,896 ng/mL          Radiology:   CT ABDOMEN PELVIS WO CONTRAST   Final Result   Moderate left and minimal right hydronephrosis, no sign of obstructing   calculus or mass, suspect bladder outlet obstruction   Small pleural effusions, may minimal free intraperitoneal fluid. Left lower lobe atelectasis      US RETROPERITONEAL COMPLETE   Final Result   Right kidney is mildly atrophic. Mild hydronephrosis of left kidney. Bladder is unremarkable.  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 *      Plan:    1. Acute kidney injury--baseline creatinine about 1.1 back in 2009 per the [facility] records. She is actively getting chemotherapy is unclear if that treatment could be contributing towards his acute kidney injury. [facility] consulted for further input. They had placed patient on Lasix for some fluid retention and he was taking ibuprofen for arthritis pain. Creatinine started to trend up and he was given IVF as outpatient without improvement. He was sent in because outpatient labs showed BRITIN with hyperkalemia. During this hospital stay, he place on IV fluids. Nephrology consulted and following and they changed to bicarb drip Saturday. Urine studies sent with FENA >1%. Renal US showed left hydronephrosis which prompted CT abd/pelvis moderate left hydronephrosis. Urology consulted by nephrology but they do not feel he has any hydroureter to warrant intervention. However, Lasix renal scan being pursued. 2. Hyperkalemia--potassium 5.6 on admission. Patient was given IV fluids repeat BMP done this evening shows improvement of potassium to 5.1. In the ER patient did receive treatment with calcium gluconate D50 and insulin. Repeat K yesterday morning was even higher at 6.5. He underwent treatment as noted above. Follow up K 5.1 this morning. 3. Abnormal EKG--asymptomatic. He has mild J-point elevation. Initial troponin negative in the ER. Repeat troponin negative. 4. BPH--continue Flomax. Checked PVR as noted above. Nephrology ordered Garcia catheter yesterday. 5. Anemia--could be from chemotherapy. Iron profile c/w iron deficiency. 6. Hypertension with prior history of CABG--continue aspirin, BB and statin    Case discussed with Dr. Liliana Butt of hem/onc yesterday. NOTE: This report was transcribed using voice recognition software. Every effort was made to ensure accuracy; however, inadvertent computerized transcription errors may be present.      Electronically signed by Naveen Downing MD on 1/17/2021 at 5:03 PM

## 2021-01-17 NOTE — PROGRESS NOTES
Physical Therapy    Physical Therapy Initial Evaluation    Room #:  0417/0417-01  Patient Name: Pushpa Pena  YOB: 1945  MRN: [id number]    Referring Provider:    Zaria Cordova MD    Date of Service: 1/17/2021    Evaluating Physical Therapist: Sharonda Schrader, PT  #[id number]       Diagnosis:   BRITNI (acute kidney injury) ([address] [N17.9]   Admitted with   Abnormal labs from [facility]    Patient Active Problem List   Diagnosis    Squamous cell carcinoma of bronchus in left lower lobe ([address]    BRITNI (acute kidney injury) ([address]    BPH (benign prostatic hyperplasia)    Coronary artery disease involving native coronary artery of native heart without angina pectoris    History of lung cancer    Malignant neoplasm of lower lobe of left lung ([address]    Acute kidney injury superimposed on CKD ([address]    Hyperkalemia    Hx of coronary artery bypass graft    Renal cancer ([address]    Benign essential HTN    Normocytic anemia    Acute kidney injury ([address]        Tentative placement recommendation: Home    Equipment recommendation: Patient has needed equipment       Prior Level of Function: Patient ambulated independently    Rehab Potential: good    for baseline    Past medical history:   Past Medical History:   Diagnosis Date    Heart attack ([facility] [address] 2009    Hypertension 2009    Lung cancer [facility]) 2017    Renal mass      Past Surgical History:   Procedure Laterality Date    APPENDECTOMY  1965    LOBECTOMY Left 05/2017       Precautions:  Up with assistance, falls and alarm ,      SUBJECTIVE:    Social history: Patient lives with spouse     Equipment owned: [employer]       [id number] Mobility Inpatient   How much difficulty turning over in bed?: None  How much difficulty sitting down on / standing up from a chair with arms?: None  How much difficulty moving from lying on back to sitting on side of bed?: None  How much help from another person moving to and from a bed to a chair?: None How much help from another person needed to walk in hospital room?: None  How much help from another person for climbing 3-5 steps with a railing?: A Little  AM-PAC Inpatient Mobility Raw Score : 23  AM-PAC Inpatient T-Scale Score : 56.93  Mobility Inpatient CMS 0-100% Score: 11.2  Mobility Inpatient CMS G-Code Modifier : CI    Nursing cleared patient for PT evaluation. The admitting diagnosis and active problem list as listed above have been reviewed prior to the initiation of this evaluation. OBJECTIVE;   Initial Evaluation  Date: 1/17/2021 Treatment Date:     Short Term/ Long Term   Goals   Was pt agreeable to Eval/treatment? Yes    To be met in 2 days   Pain level   0/10        Bed Mobility    Rolling: Independent    Supine to sit:  Independent    Sit to supine: Independent    Scooting: Independent       Transfers Sit to stand: Independent       Ambulation    150 feet using  cane with Independent         Stair negotiation: ascended and descended   Not assessed       5 steps with rail supervision    ROM Within functional limits       Strength BUE:  4/5  RLE:  4/5  LLE:  4/5   Increase strength in affected mm groups by 1/3 grade   Balance Sitting EOB:  good    Dynamic Standing:  good minus  Sitting EOB:  good    Dynamic Standing: good       Patient is Alert & Oriented x 4 and follows directions    Sensation:  Patient  denies numbness and tingling     Edema:  no   Endurance: good          Patient education  Patient educated on role of Physical Therapy, risks of immobility, safety and plan of care, energy conservation and  importance of mobility while in hospital       Patient response to education:   Pt verbalized understanding Pt demonstrated skill Pt requires further education in this area   Yes Partial Yes Treatment:  Patient practiced and was instructed/facilitated in the following treatment: Patient   Sat edge of bed 5 minutes with Independent to increase dynamic sitting balance and activity tolerance. Therapeutic Exercises:  not performed       At end of session, patient in chair with  call light and phone within reach,   all lines and tubes intact, nursing notified. Patient would benefit from continued skilled Physical Therapy to improve functional independence and quality of life. Patient's/ family goals   home        ASSESSMENT: Patient exhibits decreased strength, balance impairing functional mobility. And are barriers to d/c and require skilled intervention during hospital stay       Plan of Care:     -Standing Balance: Perform strengthening exercises in standing to promote motor control with or without upper extremity support   -Gait: Standing activities to improve: base of support, weight shift, weight bearing  and Exercises to improve hip and knee control     Patient and or family understand(s) diagnosis, prognosis, and plan of care. Frequency of treatments: Patient will be seen  2-3 times/week. Time in  240  Time out  3255    Total Treatment Time  0 minutes    Evaluation time includes thorough review of current medical information, gathering information on past medical history/social history and prior level of function, completion of standardized testing/informal observation of tasks, assessment of data, and development of Plan of care and goals.      CPT codes:  Low Complexity PT evaluation (59976)  No treatment    Ettie Cancel, PT

## 2021-01-17 NOTE — PROGRESS NOTES
Nephrology Progress Note  Patient's Name: Pushpa Pena  9:32 AM  1/17/2021    History of Present Ilness:    Pushpa Pena is a 70 y.o. 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 He was sent to the ER by the [facility] for abnormal labs. Per the patient he is receiving treatment for his cancer, he is unsure what is being given. His renal function has been abnormal recently and his most recent lab work prompted them to have him come to the ER. He states he began chemo aprox 2 weeks ago, he was receiving it 3 times per week. His baseline Cr in Dec 2020 was 1.46mg/dl. Labs upon arrival to the ER show Na 136, K 5.6, Cl 111, CO2 15, BUN 42, Cr 3.5. Hyperkalemia was treated medically in the ER. This morning his K remained elevated at 6.5, it was again treated medically. He was taking enalapril PTA, it is currently being held. DEBBI showed hydronephrosis. Upon assessment, patient just returned to his room from CT scan. He states he is feeling ok. He admits to having diarrhea for the past 2 weeks as well as decreased appetite. He denies any kidney stones or hematuria. Interval History:  1/17/21: Seen and examined in room. States he is feeling well. Past Medical History:   Diagnosis Date    Heart attack [facility] 2009    Hypertension 2009    Lung cancer [facility] 2017    Renal mass        Past Surgical History:   Procedure Laterality Date    APPENDECTOMY  1965    LOBECTOMY Left 05/2017       Family History   Problem Relation Age of Onset    Cancer Father 71        lung        reports that he quit smoking about 11 years ago. His smoking use included cigarettes. He has a 40.00 pack-year smoking history. He has never used smokeless tobacco. He reports that he does not drink alcohol or use drugs. Allergies:  Patient has no known allergies.     Current Medications:        sodium bicarbonate tablet 1,300 mg, TID   sodium bicarbonate 150 mEq in dextrose 5 % 1,000 mL infusion, Continuous      sodium chloride (PF) 0.9 % injection 0.9 mL, Once      ALPRAZolam (XANAX) tablet 0.5 mg, PRN      aspirin EC tablet 81 mg, Daily      atorvastatin (LIPITOR) tablet 20 mg, Daily      calcium carbonate (TUMS) chewable tablet 500 mg, Daily PRN      carvedilol (COREG) tablet 12.5 mg, BID WC      dexamethasone (DECADRON) injection 4 mg, Q24H      tamsulosin (FLOMAX) capsule 0.4 mg, Nightly      gabapentin (NEURONTIN) capsule 600 mg, BID      sodium chloride flush 0.9 % injection 10 mL, 2 times per day      sodium chloride flush 0.9 % injection 10 mL, PRN      promethazine (PHENERGAN) tablet 12.5 mg, Q6H PRN    Or      ondansetron (ZOFRAN) injection 4 mg, Q6H PRN      polyethylene glycol (GLYCOLAX) packet 17 g, Daily PRN      acetaminophen (TYLENOL) tablet 650 mg, Q6H PRN    Or      acetaminophen (TYLENOL) suppository 650 mg, Q6H PRN      heparin (porcine) injection 5,000 Units, 3 times per day        Review of Systems:   Pertinent items are noted in HPI. Physical exam:   Constitutional:    Vitals: VITALS:  /82   Pulse 80   Temp 97.8 °F (36.6 °C) (Oral)   Resp 18   Ht 5' 7\" (1.702 m)   Wt 179 lb 3.2 oz (81.3 kg)   SpO2 98%   BMI 28.07 kg/m²   24HR INTAKE/OUTPUT:      Intake/Output Summary (Last 24 hours) at 1/17/2021 0932  Last data filed at 1/17/2021 5900  Gross per 24 hour   Intake 600 ml   Output 1450 ml   Net -850 ml     URINARY CATHETER OUTPUT (Garcia):  Urethral Catheter Non-latex 16 fr-Output (mL): 750 mL  Skin: no rash, turgor wnl  Heent:  eomi, mmm  Neck: no bruits or jvd noted  Cardiovascular:  S1, S2 without m/r/g.  Right chest mediport  Respiratory: CTA B without w/r/r  Abdomen:  +bs, soft, nt, nd  Ext: neg lower extremity edema  Psychiatric: mood and affect appropriate  Musculoskeletal:  Rom, muscular strength intact    Data:   Labs:  CBC:   Lab Results   Component Value Date    WBC 4.0 01/17/2021 ORDERING SYSTEM PROVIDED HISTORY: BRITNI on CKD   TECHNOLOGIST PROVIDED HISTORY:   Reason for exam:->BRITNI on CKD   What reading provider will be dictating this exam?->CRC   FINDINGS:   Kidneys: The right kidney measures 8.7 x 4.7 x 4.3cm in length and the left kidney   measures 11 x 6.6 x 5.8 cm in length. Kidneys demonstrate normal cortical echogenicity.  There is mild   hydronephrosis within the left kidney.  No evidence of hydronephrosis or   intrarenal stones. Bladder:   Unremarkable appearance of the bladder.  No significant post void   residual.The urinary bladder appears unremarkable. Right urinary jet is   noted. Left urinary jet is not visible.     Impression:       Right kidney is mildly atrophic. Mild hydronephrosis of left kidney. Bladder is unremarkable. Left urinary jet is not visualized           Assessment and Plan  1. Stage II BRITNI sec to combined effects of the ACEI in the setting of the obstructive uropathy  CT abd with moderate hydro L and mild hydro R kidney  FeNa >1% supporting intrarenal origin  Bladder scan with 264mL noted  Cr 3.5->3.4  UO 1450mL in last 24 hours  PLAN: 1. Follow labs  2. Continue Garcia  3. Holding ACEI  4. Patient for NM renal scan with lasix per urology  5. Continue IVF    2. HTN  BP at goal <130/80  PLAN: 1. Continue current medications    3. Hyperkalemia in the setting of the BRITNI, obstructive uropathy and the ACEI  K 6.5-->-->5.6-->5.1 this AM after medically treated  PLAN: 1. Follow K  2. Low K diet    4. NAG metabolic acidosis in the setting of the BRITNI and obstructive uropathy  HCO3 goal >=22  PLAN: 1. HCO3 improving on HCO3 based IVF    5. Anemia in CKD and [facility] Ca with mets to kidney  Hgb below goal >10  Ferritin 1896 with an Iron Sat 16%  PLAN: 1. Defer to heme/onc    6. CKD G3A with baseline Cr 1.46mg/dl and eGFR 47ml/min    7.  Lung cancer with mets to kidney  Follows with [facility] Currently receiving Chemotherapy 3 times per week receiving Cyramza and Docetaxel  PLAN: 1. Defer to heme/onc    8. Hypomagnesemia  Mag 1.3  PLAN: 1. Replace Mag IV  2. Repeat Mag in AM    Thank you Dr. Lucas primary care provider on file. for allowing us to participate in care of Suzie ValladaresALTA matthews - NP  9:32 AM  1/17/2021   Pt seen and examined no new complaints  A/P:  1. Stage II BRITNI-await nthe NM Scan    2.  NAG Met Acidosis-HCO3 up to 23   PLAN:1. Will stop the IV HCO3  2 Start oral HCO3    Agree with the remainder as above    JUAN A Park MD

## 2021-01-18 ENCOUNTER — APPOINTMENT (OUTPATIENT)
Dept: NUCLEAR MEDICINE | Age: 76
DRG: 660 | End: 2021-01-18
Payer: MEDICARE

## 2021-01-18 LAB
ANION GAP SERPL CALCULATED.3IONS-SCNC: 12 MMOL/L (ref 7–16)
BASOPHILS ABSOLUTE: 0 E9/L (ref 0–0.2)
BASOPHILS RELATIVE PERCENT: 0 % (ref 0–2)
BUN BLDV-MCNC: 49 MG/DL (ref 8–23)
CALCIUM SERPL-MCNC: 8.2 MG/DL (ref 8.6–10.2)
CHLORIDE BLD-SCNC: 103 MMOL/L (ref 98–107)
CO2: 24 MMOL/L (ref 22–29)
CREAT SERPL-MCNC: 3.2 MG/DL (ref 0.7–1.2)
EOSINOPHILS ABSOLUTE: 0 E9/L (ref 0.05–0.5)
EOSINOPHILS RELATIVE PERCENT: 0 % (ref 0–6)
GFR AFRICAN AMERICAN: 23
GFR NON-AFRICAN AMERICAN: 19 ML/MIN/1.73
GLUCOSE BLD-MCNC: 104 MG/DL (ref 74–99)
HCT VFR BLD CALC: 28.7 % (ref 37–54)
HEMOGLOBIN: 8.8 G/DL (ref 12.5–16.5)
IMMATURE GRANULOCYTES #: 0.03 E9/L
IMMATURE GRANULOCYTES %: 0.7 % (ref 0–5)
LYMPHOCYTES ABSOLUTE: 0.69 E9/L (ref 1.5–4)
LYMPHOCYTES RELATIVE PERCENT: 15.9 % (ref 20–42)
MAGNESIUM: 1.7 MG/DL (ref 1.6–2.6)
MCH RBC QN AUTO: 30.2 PG (ref 26–35)
MCHC RBC AUTO-ENTMCNC: 30.7 % (ref 32–34.5)
MCV RBC AUTO: 98.6 FL (ref 80–99.9)
MONOCYTES ABSOLUTE: 0.34 E9/L (ref 0.1–0.95)
MONOCYTES RELATIVE PERCENT: 7.8 % (ref 2–12)
NEUTROPHILS ABSOLUTE: 3.29 E9/L (ref 1.8–7.3)
NEUTROPHILS RELATIVE PERCENT: 75.6 % (ref 43–80)
PDW BLD-RTO: 18.6 FL (ref 11.5–15)
PHOSPHORUS: 3.4 MG/DL (ref 2.5–4.5)
PLATELET # BLD: 238 E9/L (ref 130–450)
PMV BLD AUTO: 10.4 FL (ref 7–12)
POTASSIUM SERPL-SCNC: 4.1 MMOL/L (ref 3.5–5)
RBC # BLD: 2.91 E12/L (ref 3.8–5.8)
SODIUM BLD-SCNC: 139 MMOL/L (ref 132–146)
WBC # BLD: 4.4 E9/L (ref 4.5–11.5)

## 2021-01-18 PROCEDURE — 6370000000 HC RX 637 (ALT 250 FOR IP): Performed by: STUDENT IN AN ORGANIZED HEALTH CARE EDUCATION/TRAINING PROGRAM

## 2021-01-18 PROCEDURE — 97530 THERAPEUTIC ACTIVITIES: CPT

## 2021-01-18 PROCEDURE — 6360000002 HC RX W HCPCS: Performed by: RADIOLOGY

## 2021-01-18 PROCEDURE — 6370000000 HC RX 637 (ALT 250 FOR IP): Performed by: NURSE PRACTITIONER

## 2021-01-18 PROCEDURE — 36591 DRAW BLOOD OFF VENOUS DEVICE: CPT

## 2021-01-18 PROCEDURE — 6360000002 HC RX W HCPCS: Performed by: STUDENT IN AN ORGANIZED HEALTH CARE EDUCATION/TRAINING PROGRAM

## 2021-01-18 PROCEDURE — 80048 BASIC METABOLIC PNL TOTAL CA: CPT

## 2021-01-18 PROCEDURE — 3430000000 HC RX DIAGNOSTIC RADIOPHARMACEUTICAL: Performed by: RADIOLOGY

## 2021-01-18 PROCEDURE — A9562 TC99M MERTIATIDE: HCPCS | Performed by: RADIOLOGY

## 2021-01-18 PROCEDURE — 1200000000 HC SEMI PRIVATE

## 2021-01-18 PROCEDURE — 97165 OT EVAL LOW COMPLEX 30 MIN: CPT

## 2021-01-18 PROCEDURE — 78708 K FLOW/FUNCT IMAGE W/DRUG: CPT

## 2021-01-18 PROCEDURE — 36415 COLL VENOUS BLD VENIPUNCTURE: CPT

## 2021-01-18 PROCEDURE — 2580000003 HC RX 258: Performed by: INTERNAL MEDICINE

## 2021-01-18 PROCEDURE — 84100 ASSAY OF PHOSPHORUS: CPT

## 2021-01-18 PROCEDURE — 83735 ASSAY OF MAGNESIUM: CPT

## 2021-01-18 PROCEDURE — 99233 SBSQ HOSP IP/OBS HIGH 50: CPT | Performed by: INTERNAL MEDICINE

## 2021-01-18 PROCEDURE — 85025 COMPLETE CBC W/AUTO DIFF WBC: CPT

## 2021-01-18 RX ORDER — SODIUM BICARBONATE 650 MG/1
1300 TABLET ORAL 2 TIMES DAILY
Status: DISCONTINUED | OUTPATIENT
Start: 2021-01-19 | End: 2021-01-20 | Stop reason: HOSPADM

## 2021-01-18 RX ORDER — CEFAZOLIN SODIUM 2 G/50ML
2 SOLUTION INTRAVENOUS
Status: DISPENSED | OUTPATIENT
Start: 2021-01-18 | End: 2021-01-19

## 2021-01-18 RX ADMIN — SODIUM BICARBONATE 650 MG TABLET 1300 MG: at 16:05

## 2021-01-18 RX ADMIN — ATORVASTATIN CALCIUM 20 MG: 20 TABLET, FILM COATED ORAL at 10:15

## 2021-01-18 RX ADMIN — DEXAMETHASONE SODIUM PHOSPHATE 4 MG: 4 INJECTION, SOLUTION INTRAMUSCULAR; INTRAVENOUS at 18:41

## 2021-01-18 RX ADMIN — SODIUM BICARBONATE 650 MG TABLET 1300 MG: at 10:14

## 2021-01-18 RX ADMIN — Medication 10 MILLICURIE: at 08:37

## 2021-01-18 RX ADMIN — FUROSEMIDE 40 MG: 10 INJECTION, SOLUTION INTRAMUSCULAR; INTRAVENOUS at 08:47

## 2021-01-18 RX ADMIN — ASPIRIN 81 MG: 81 TABLET, COATED ORAL at 10:14

## 2021-01-18 RX ADMIN — HEPARIN SODIUM 5000 UNITS: 5000 INJECTION INTRAVENOUS; SUBCUTANEOUS at 16:05

## 2021-01-18 RX ADMIN — SODIUM CHLORIDE 1000 ML: 9 INJECTION, SOLUTION INTRAVENOUS at 10:14

## 2021-01-18 RX ADMIN — GABAPENTIN 600 MG: 300 CAPSULE ORAL at 21:17

## 2021-01-18 RX ADMIN — HEPARIN SODIUM 5000 UNITS: 5000 INJECTION INTRAVENOUS; SUBCUTANEOUS at 06:17

## 2021-01-18 RX ADMIN — GABAPENTIN 600 MG: 300 CAPSULE ORAL at 10:14

## 2021-01-18 RX ADMIN — TAMSULOSIN HYDROCHLORIDE 0.4 MG: 0.4 CAPSULE ORAL at 21:17

## 2021-01-18 RX ADMIN — CARVEDILOL 12.5 MG: 6.25 TABLET, FILM COATED ORAL at 16:05

## 2021-01-18 RX ADMIN — CARVEDILOL 12.5 MG: 6.25 TABLET, FILM COATED ORAL at 10:15

## 2021-01-18 ASSESSMENT — PAIN SCALES - GENERAL: PAINLEVEL_OUTOF10: 0

## 2021-01-18 NOTE — CARE COORDINATION
1/18/2021 1408 CM note: NO COVID TESTING. Met with patient at the bedside to discuss transition of care at discharge. Patient resides with his wife in a 2 floor 2 steps to enter home; however, patient stays mainly on the first floor. Patient is independent with ADLs, drives, and DME includes walker and cane. Pts PCP is Dr Jim Delgadillo and his pharmacy is [facility] on [address] in [place]. Patient has hx lung Ca with mets to kidney, LLL lobectomy, has mediport, and gets chemo at [facility]. Patient has no hx HHC or [facility]. Discharge plan is home and patient does not anticipate any needs. Pts wife will provide transportation home.

## 2021-01-18 NOTE — PROGRESS NOTES
OCCUPATIONAL THERAPY  Initial Evaluation  Date:2021  Patient Name: Alina Martines  MRN: [id number]  : 1945  ROOM #: [id number]     Referring Provider: DR Behzad Blackburn  Evaluating OT: Edda Burtonr OTR/L [id number]    Placement Recommendation: Home with no skilled occupational therapy needed after discharge from inpatient. Recommended Adaptive Equipment: none     [facility]   AM-PAC Daily Activity Inpatient   How much help for putting on and taking off regular lower body clothing?: A Little  How much help for Bathing?: A Little  How much help for Toileting?: A Little  How much help for putting on and taking off regular upper body clothing?: A Little  How much help for taking care of personal grooming?: A Little  How much help for eating meals?: None  AM-PAC Inpatient Daily Activity Raw Score: 19  AM-PAC Inpatient ADL T-Scale Score : 40.22  ADL Inpatient CMS 0-100% Score: 42.8  ADL Inpatient CMS G-Code Modifier : CK    Based on patient's functional performance as stated below and their level of assistance needed prior to admission, this therapist believes that the patient would benefit from skilled Occupational Therapy following their hospital stay in an effort to increase safety and independence with completion of ADL/IADL tasks for functional independence and quality of life. Diagnosis:   1. Acute kidney injury ([facility] [address]    2. Hyperkalemia    3. Extravasation of vesicant agent      Pertinent Medical History:   Past Medical History:   Diagnosis Date    Heart attack ([facility] [address]     Hypertension 2009    Lung cancer [facility]) 2017    Renal mass       Precautions:  Falls  Pain Scale: Numeric Rate: no pain; Nursing notified. Social history: with family: spouse       Drive: yes   Home architecture: single family, 2 story, resides on , 2 steps to enter with rail, tub shower.    PLOF: independent with BADL and independent with IADL, pt ambulated with straight cane Equipment owned: wheeled walker, straight cane, tub bench, grab bars   Cognition: A&O x 4; follows 3 step directions. good  Problem solving skills   good  Memory    good  Sequencing   good  Judgement/safety  Communication: intact   Visual perceptual skills: intact     Glasses: yes   Edema: no     Sensation: intact   Hand Dominance:  Left     X  Right     Left Right Comment   Passive range of motion [facility]     Active range of motion [facility]     Muscle Grade 4/5 4/5    /pinch Strength Intact  Intact       Functional Assessment:   Initial Evaluation Status Date:   1/18/2021 Treatment Status  Date: STGs = LTGs  Time frame: 5 - 14 days   Feeding Independent   Independent    Grooming Supervision   Independent    UB Dressing Supervision   Independent    LB Dressing Supervision   Independent    Bathing Supervision  Independent    Toileting Supervision    Independent    Bed Mobility  N/T as pt was up in chair upon arrival to the room and returned to bedside chair to eat lunch  Supine to sit: Independent   Scooting:Independent  Sit to supine: Independent   Rolling: Independent   Functional Transfers Supervision from bedside chair to straight cane. Transfer training with verbal cues for hand placement throughout session to improve safety. Independent    Functional Mobility Supervision with straight cane to improve balance, verbal cues for sequence and safety.    Modified [place]    Activity Tolerance Fair   Good      Balance:   Sitting balance in bedside chair and commode to increase dynamic sitting balance and activities tolerance with supervision     Standing fair with cane to improve balance   Endurance: fair Comments: Upon arrival to the room the patient was in bedside chair. At end of the session, the patient was returned to bedside chair. Call light and phone within reach. Pt required verbal cues and instruction as noted above for safe facilitation and completion of tasks. Therapist provided skilled monitoring of the patient's response during treatment session. Prior to and at the end of session, environmental modifications/line management completed for patients safety and efficiency of treatment session. OT services provided to include instruction/training on safety and adapted techniques for completion of transfers and ADL's. Overall, the patient demonstrates difficulties with completion of BADL's and IADL's. Factors contributing to these difficulties includes decreased endurance and generalized weakness. Pt would benefit from continued skilled OT to increase independence with BADL's and IADL's. Treatment:    Bed mobility: Facilitated bed mobility with cues for proper body mechanics and sequencing to prepare for ADL completion. Functional transfers: Facilitated transfers from various surfaces with cues for body alignment, safety and hand placement. Postural Balance: Sitting and standing balance retraining to improve righting reactions with postural changes during ADLs. Skilled positioning: Proper positioning to improve interaction with environment, overall functioning and decrease/prevent edema and contractures. Evaluation Complexity: return home   · Low Complexity  · History: Brief history including review of medical records relating to the problem  · Exam: 1-3 performance Deficits  · Assistance/Modification: No assistance or modifications required to perform tasks. No comorbities affecting occupational performance.     Assessment of current deficits:   Functional mobility [x]        ADLs [x]        Strength [x]      Cognition [] OT Eval High [id number]     OT Re-Eval [id number]          ADL/Self Care [id number]     Therapeutic Activities 76351 15    Therapeutic Ex 08624     Orthotic Management 23150     Neuro Re-Ed 22833     Non-Billable Time     TOTAL TIMED TREATMENT [address] [place] OTR/L [id number]

## 2021-01-18 NOTE — PROGRESS NOTES
[facility] Quality Flow/Interdisciplinary Rounds Progress Note        Quality Flow Rounds held on January 18, 2021    Disciplines Attending:  Bedside Nurse, ,  and Nursing Unit Leadership    Rito Klinefelter was admitted on 1/15/2021 10:02 AM    Anticipated Discharge Date:       Disposition:    Lencho Score:  Lencho Scale Score: 19    Readmission Risk              Risk of Unplanned Readmission:        29           Discussed patient goal for the day, patient clinical progression, and barriers to discharge.   The following Goal(s) of the Day/Commitment(s) have been identified:  Activity progression, steffen martines RA Terresa Handing  January 18, 2021

## 2021-01-18 NOTE — PROGRESS NOTES
Nephrology Note  Courtney Roberto MD          Patient seen and examined. No family member is present at bedside. Chart reviewed. Patient had a nuclear renal flow scan with Lasix which showed delayed excretion on the left side suggesting obstruction. Patient is sitting up in the chair. He is feeling much better. He is denying any shortness of breath. Appetite good. No nausea or dysguesia. He is anxious to be discharged home. .   Awake and alert . In no acute distress. Vital SignsBP 136/73   Pulse 68   Temp 97.8 °F (36.6 °C) (Oral)   Resp 18   Ht 5' 7\" (1.702 m)   Wt 177 lb (80.3 kg)   SpO2 97%   BMI 27.72 kg/m²   24HR INTAKE/OUTPUT:    Intake/Output Summary (Last 24 hours) at 1/18/2021 1156  Last data filed at 1/18/2021 8266  Gross per 24 hour   Intake 240 ml   Output 2325 ml   Net -2085 ml         Physical Exam    Neck: No JVD  Lungs: Breath sounds decreased at the bases. No rales or ronchi. Heart: Regular rate and rhythm. No S3 gallop. No  murmrur. Abdomen: Soft non distended, non tender and normal bowel sounds. Extremeties: No edema.       ROS:  RESPIRATORY:  negative  CARDIOVASCULAR:  negative  GASTROINTESTINAL:  negative  GENITOURINARY:  negative    Current Facility-Administered Medications   Medication Dose Route Frequency Provider Last Rate Last Admin    sodium bicarbonate tablet 1,300 mg  1,300 mg Oral BID Marcelo Reeves MD   1,300 mg at 01/17/21 2157    0.9 % sodium chloride infusion  1,000 mL Intravenous Continuous Marcelo Reeves MD 50 mL/hr at 01/18/21 1014 1,000 mL at 01/18/21 1014    sodium bicarbonate tablet 1,300 mg  1,300 mg Oral TID ALTA Reid - NP   1,300 mg at 01/18/21 1014    sodium chloride (PF) 0.9 % injection 0.9 mL  0.9 mL Intravenous Once Vahid Shanks MD        ALPRAZolam Gina Lowers) tablet 0.5 mg  0.5 mg Oral PRN Akhil Perez MD        aspirin EC tablet 81 mg  81 mg Oral Daily Akhil Perez MD   81 mg at 01/18/21 1014  atorvastatin (LIPITOR) tablet 20 mg  20 mg Oral Daily Suzie Bautista MD   20 mg at 01/18/21 1015    calcium carbonate (TUMS) chewable tablet 500 mg  1 tablet Oral Daily PRN Suzie Bautista MD        carvedilol (COREG) tablet 12.5 mg  12.5 mg Oral BID WC Suzie Bautista MD   12.5 mg at 01/18/21 1015    dexamethasone (DECADRON) injection 4 mg  4 mg Oral Q24H Suzie Bautista MD   4 mg at 01/17/21 1727    tamsulosin (FLOMAX) capsule 0.4 mg  0.4 mg Oral Nightly Suzie Bautista MD   0.4 mg at 01/17/21 2156    gabapentin (NEURONTIN) capsule 600 mg  600 mg Oral BID Suzie Bautista MD   600 mg at 01/18/21 1014    sodium chloride flush 0.9 % injection 10 mL  10 mL Intravenous 2 times per day Suzie Bautista MD   10 mL at 01/15/21 2046    sodium chloride flush 0.9 % injection 10 mL  10 mL Intravenous PRN Suzie Bautista MD        promethazine (PHENERGAN) tablet 12.5 mg  12.5 mg Oral Q6H PRN Suzie Bautista MD        Or    ondansetron [facility]) injection 4 mg  4 mg Intravenous Q6H PRN Suzie Bautista MD        polyethylene glycol [facility]) packet 17 g  17 g Oral Daily PRN Suzie Bautista MD        acetaminophen (TYLENOL) tablet 650 mg  650 mg Oral Q6H PRN Suzie Bautista MD        Or   Slim Arias acetaminophen (TYLENOL) suppository 650 mg  650 mg Rectal Q6H PRN Suzie Bautista MD        heparin (porcine) injection 5,000 Units  5,000 Units Subcutaneous 3 times per day Suzie Bautista MD   5,000 Units at 01/18/21 0617       NM RENAL WITH LASIX   Final Result   Normal flow and function of the right kidney without obstruction. Abnormally functioning left kidney with delayed slow washout following Lasix   administration, suggestive of high-grade obstruction. Correlation with   retrograde pyelography may be helpful. Split renal uptake is 65% on the left and 35% on the right.       CT ABDOMEN PELVIS WO CONTRAST   Final Result   Moderate left and minimal right hydronephrosis, no sign of obstructing calculus or mass, suspect bladder outlet obstruction   Small pleural effusions, may minimal free intraperitoneal fluid. Left lower lobe atelectasis      US RETROPERITONEAL COMPLETE   Final Result   Right kidney is mildly atrophic. Mild hydronephrosis of left kidney. Bladder is unremarkable. Left urinary jet is not visualized            Labs:    CBC:   Recent Labs     01/16/21  0445 01/17/21  0449 01/18/21  0615   WBC 3.1* 4.0* 4.4*   HGB 9.4* 8.5* 8.8*    214 238        Lab Results   Component Value Date    IRON 32 (L) 01/16/2021    TIBC 196 (L) 01/16/2021    FERRITIN 1,896 01/16/2021       Lab Results   Component Value Date    CALCIUM 8.3 (L) 01/17/2021    CALCIUM 8.5 (L) 01/17/2021    CALCIUM 8.9 01/16/2021    PHOS 3.5 01/17/2021    PHOS 3.3 01/15/2021    MG 1.7 01/18/2021    MG 1.3 (L) 01/17/2021    MG 1.6 01/16/2021       BMP:   Recent Labs     01/16/21  2253 01/17/21  0449 01/17/21  1116    135 135   K 5.5* 5.1* 4.5    103 100   CO2 20* 23 26   BUN 40* 40* 40*   CREATININE 3.3* 3.4* 3.3*   GLUCOSE 132* 138* 101*       Basic metabolic panel from today is pending. Assessment: / Plan:    1. Acute kidney injury. Acute kidney injury stage II secondary to use of ACE inhibitor and volume depletion as well as obstructive uropathy. Serum creatinine from today is pending. .  Will follow closely. Continue to hold ACE inhibitor. Continue hydration. 2.  Hypertension with chronic kidney disease stage G1 to G4. Blood pressure at target of less than 130/80 mmHg. 3.  Hyperkalemia. Hyperkalemia is improved. Hyperkalemia secondary acute kidney injury in the setting of obstructive uropathy and use of ACE inhibitor. Continue low potassium diet. Anemia of chronic kidney disease. Follow hemoglobin hematocrit. 4.   Hypomagnesemia. Magnesium levels improved. Fany Camacho 5.  Metabolic acidosis. Patient with non-anion gap metabolic acidosis in the setting of acute kidney injury and obstructive uropathy. Bicarbonate levels improved. Target bicarbonate level 22 mmol/L. Off bicarbonate supplement. 6.  Chronic kidney disease stage G3a with a baseline serum creatinine 1.46 mg/dL. 7.   Anemia. Defer management to hematology oncology. Theodora Worthington MD  Nephrology  Electronically signed by Theodora Worthington MD on 1/18/2021 at 11:54 AM      Note: This report was completed utilizing computer voice recognition software. Every effort has been made to ensure accuracy, however; inadvertent computerized transcription errors may be present.

## 2021-01-18 NOTE — PROGRESS NOTES
[address] [place], [place] [address]   Progress Note    Admitting Date and Time: 1/15/2021 10:02 AM  Admit Dx: BRITNI (acute kidney injury) ([facility] [address] [N17.9]    Subjective:    1/16: Pt feels okay but not enthused about Garcia catheter. 1/17: Patient eager to get home as soon as possible. 1/18: Patient denies any specific complaints, feels eager to go home. Lasix renal scan done this morning shows abnormally functioning left kidney with delayed slow washout suggesting high-grade obstruction. Creatinine stable at 3.2, potassium 4.1 this morning.        sodium bicarbonate  1,300 mg Oral BID    sodium bicarbonate  1,300 mg Oral TID    sodium chloride (PF)  0.9 mL Intravenous Once    aspirin  81 mg Oral Daily    atorvastatin  20 mg Oral Daily    carvedilol  12.5 mg Oral BID WC    dexamethasone  4 mg Oral Q24H    tamsulosin  0.4 mg Oral Nightly    gabapentin  600 mg Oral BID    sodium chloride flush  10 mL Intravenous 2 times per day    heparin (porcine)  5,000 Units Subcutaneous 3 times per day         ALPRAZolam, 0.5 mg, PRN      calcium carbonate, 1 tablet, Daily PRN      sodium chloride flush, 10 mL, PRN      promethazine, 12.5 mg, Q6H PRN    Or      ondansetron, 4 mg, Q6H PRN      polyethylene glycol, 17 g, Daily PRN      acetaminophen, 650 mg, Q6H PRN    Or      acetaminophen, 650 mg, Q6H PRN         Objective:    /73   Pulse 68   Temp 97.8 °F (36.6 °C) (Oral)   Resp 18   Ht 5' 7\" (1.702 m)   Wt 177 lb (80.3 kg)   SpO2 97%   BMI 27.72 kg/m²   General Appearance: alert and oriented to person, place and time and in no acute distress  Skin: warm and dry  Head: normocephalic and atraumatic  Eyes: pupils equal, round, and reactive to light, extraocular eye movements intact, conjunctivae normal  Neck: neck supple and non tender without mass Pulmonary/Chest: clear to auscultation bilaterally- no wheezes, rales or rhonchi, normal air movement, no respiratory distress. Right chest wall with Mediport in place  Cardiovascular: normal rate, normal S1 and S2 and no carotid bruits  Abdomen: soft, non-tender, non-distended, normal bowel sounds, no masses or organomegaly  Extremities: Trace bilateral pedal edema. No cyanosis or clubbing. Neurologic: no cranial nerve deficit and speech normal      Recent Labs     01/17/21  0449 01/17/21  1116 01/18/21  1200    135 139   K 5.1* 4.5 4.1    100 103   CO2 23 26 24   BUN 40* 40* 49*   CREATININE 3.4* 3.3* 3.2*   GLUCOSE 138* 101* 104*   CALCIUM 8.5* 8.3* 8.2*       No results for input(s): ALKPHOS, PROT, LABALBU, BILITOT, AST, ALT in the last 72 hours. Recent Labs     01/16/21 0445 01/17/21 0449 01/18/21  0615   WBC 3.1* 4.0* 4.4*   RBC 3.17* 2.85* 2.91*   HGB 9.4* 8.5* 8.8*   HCT 31.7* 28.1* 28.7*   .0* 98.6 98.6   MCH 29.7 29.8 30.2   MCHC 29.7* 30.2* 30.7*   RDW 18.4* 18.6* 18.6*    214 238   MPV 10.7 10.6 10.4       Iron and TIBC [[id number]] (Abnormal) Collected: 01/16/21 0444     Specimen: Blood Updated: 01/16/21 1139      Iron 32 mcg/dL       TIBC 196 mcg/dL       Iron Saturation 16 %      Ferritin [[id number]] Collected: 01/16/21 0444     Specimen: Blood Updated: 01/16/21 1139      Ferritin 1,896 ng/mL          Radiology:   NM RENAL WITH LASIX   Final Result   Normal flow and function of the right kidney without obstruction. Abnormally functioning left kidney with delayed slow washout following Lasix   administration, suggestive of high-grade obstruction. Correlation with   retrograde pyelography may be helpful. Split renal uptake is 65% on the left and 35% on the right.       CT ABDOMEN PELVIS WO CONTRAST   Final Result   Moderate left and minimal right hydronephrosis, no sign of obstructing   calculus or mass, suspect bladder outlet obstruction Small pleural effusions, may minimal free intraperitoneal fluid. Left lower lobe atelectasis      US RETROPERITONEAL COMPLETE   Final Result   Right kidney is mildly atrophic. Mild hydronephrosis of left kidney. Bladder is unremarkable. 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 *      Plan:    1. Acute kidney injury on CKD F8m--Ea is actively getting chemotherapy is unclear if that treatment could be contributing towards his acute kidney injury. [facility] consulted for further input. They had placed patient on Lasix for some fluid retention and he was taking ibuprofen for arthritis pain. Creatinine started to trend up and he was given IVF as outpatient without improvement. He was sent in because outpatient labs showed BRITNI with hyperkalemia. During this hospital stay, he place on IV fluids. Nephrology consulted and following and they changed to bicarb drip Saturday. Urine studies sent with FENA >1%. Renal US showed left hydronephrosis which prompted CT abd/pelvis moderate left hydronephrosis. Urology consulted by nephrology but they do not feel he has any hydroureter to warrant intervention. However, Lasix renal scan suggested high-grade obstruction. Call placed to urology to see if he will need retrograde pyelogram while inpatient. Currently on IV fluids normal saline at 50 mL/h per nephrology. Oral bicarbonate supplementation.

## 2021-01-19 ENCOUNTER — ANESTHESIA EVENT (OUTPATIENT)
Dept: OPERATING ROOM | Age: 76
DRG: 660 | End: 2021-01-19
Payer: MEDICARE

## 2021-01-19 ENCOUNTER — ANESTHESIA (OUTPATIENT)
Dept: OPERATING ROOM | Age: 76
DRG: 660 | End: 2021-01-19
Payer: MEDICARE

## 2021-01-19 ENCOUNTER — APPOINTMENT (OUTPATIENT)
Dept: GENERAL RADIOLOGY | Age: 76
DRG: 660 | End: 2021-01-19
Payer: MEDICARE

## 2021-01-19 VITALS
DIASTOLIC BLOOD PRESSURE: 56 MMHG | OXYGEN SATURATION: 100 % | TEMPERATURE: 96.8 F | SYSTOLIC BLOOD PRESSURE: 91 MMHG | RESPIRATION RATE: 7 BRPM

## 2021-01-19 LAB
ANION GAP SERPL CALCULATED.3IONS-SCNC: 12 MMOL/L (ref 7–16)
BASOPHILS ABSOLUTE: 0 E9/L (ref 0–0.2)
BASOPHILS RELATIVE PERCENT: 0 % (ref 0–2)
BUN BLDV-MCNC: 53 MG/DL (ref 8–23)
CALCIUM SERPL-MCNC: 7.9 MG/DL (ref 8.6–10.2)
CHLORIDE BLD-SCNC: 105 MMOL/L (ref 98–107)
CO2: 24 MMOL/L (ref 22–29)
CREAT SERPL-MCNC: 3.3 MG/DL (ref 0.7–1.2)
EOSINOPHILS ABSOLUTE: 0 E9/L (ref 0.05–0.5)
EOSINOPHILS RELATIVE PERCENT: 0 % (ref 0–6)
GFR AFRICAN AMERICAN: 22
GFR NON-AFRICAN AMERICAN: 18 ML/MIN/1.73
GLUCOSE BLD-MCNC: 117 MG/DL (ref 74–99)
HCT VFR BLD CALC: 26.2 % (ref 37–54)
HEMOGLOBIN: 8.2 G/DL (ref 12.5–16.5)
IMMATURE GRANULOCYTES #: 0.02 E9/L
IMMATURE GRANULOCYTES %: 0.5 % (ref 0–5)
LYMPHOCYTES ABSOLUTE: 0.74 E9/L (ref 1.5–4)
LYMPHOCYTES RELATIVE PERCENT: 19.6 % (ref 20–42)
MAGNESIUM: 1.6 MG/DL (ref 1.6–2.6)
MCH RBC QN AUTO: 30.7 PG (ref 26–35)
MCHC RBC AUTO-ENTMCNC: 31.3 % (ref 32–34.5)
MCV RBC AUTO: 98.1 FL (ref 80–99.9)
MONOCYTES ABSOLUTE: 0.34 E9/L (ref 0.1–0.95)
MONOCYTES RELATIVE PERCENT: 9 % (ref 2–12)
NEUTROPHILS ABSOLUTE: 2.67 E9/L (ref 1.8–7.3)
NEUTROPHILS RELATIVE PERCENT: 70.9 % (ref 43–80)
PDW BLD-RTO: 18.6 FL (ref 11.5–15)
PHOSPHORUS: 3 MG/DL (ref 2.5–4.5)
PLATELET # BLD: 189 E9/L (ref 130–450)
PMV BLD AUTO: 10.6 FL (ref 7–12)
POTASSIUM SERPL-SCNC: 4.8 MMOL/L (ref 3.5–5)
RBC # BLD: 2.67 E12/L (ref 3.8–5.8)
SODIUM BLD-SCNC: 141 MMOL/L (ref 132–146)
WBC # BLD: 3.8 E9/L (ref 4.5–11.5)

## 2021-01-19 PROCEDURE — C1769 GUIDE WIRE: HCPCS | Performed by: UROLOGY

## 2021-01-19 PROCEDURE — 85025 COMPLETE CBC W/AUTO DIFF WBC: CPT

## 2021-01-19 PROCEDURE — 2580000003 HC RX 258: Performed by: UROLOGY

## 2021-01-19 PROCEDURE — 6370000000 HC RX 637 (ALT 250 FOR IP): Performed by: UROLOGY

## 2021-01-19 PROCEDURE — 6370000000 HC RX 637 (ALT 250 FOR IP): Performed by: STUDENT IN AN ORGANIZED HEALTH CARE EDUCATION/TRAINING PROGRAM

## 2021-01-19 PROCEDURE — BT1F1ZZ FLUOROSCOPY OF LEFT KIDNEY, URETER AND BLADDER USING LOW OSMOLAR CONTRAST: ICD-10-PCS | Performed by: UROLOGY

## 2021-01-19 PROCEDURE — 80048 BASIC METABOLIC PNL TOTAL CA: CPT

## 2021-01-19 PROCEDURE — 6360000002 HC RX W HCPCS: Performed by: INTERNAL MEDICINE

## 2021-01-19 PROCEDURE — 1200000000 HC SEMI PRIVATE

## 2021-01-19 PROCEDURE — 0T778DZ DILATION OF LEFT URETER WITH INTRALUMINAL DEVICE, VIA NATURAL OR ARTIFICIAL OPENING ENDOSCOPIC: ICD-10-PCS | Performed by: UROLOGY

## 2021-01-19 PROCEDURE — 6360000002 HC RX W HCPCS: Performed by: UROLOGY

## 2021-01-19 PROCEDURE — 94640 AIRWAY INHALATION TREATMENT: CPT

## 2021-01-19 PROCEDURE — 2580000003 HC RX 258: Performed by: NURSE ANESTHETIST, CERTIFIED REGISTERED

## 2021-01-19 PROCEDURE — C2617 STENT, NON-COR, TEM W/O DEL: HCPCS | Performed by: UROLOGY

## 2021-01-19 PROCEDURE — 6360000002 HC RX W HCPCS: Performed by: NURSE ANESTHETIST, CERTIFIED REGISTERED

## 2021-01-19 PROCEDURE — 7100000001 HC PACU RECOVERY - ADDTL 15 MIN: Performed by: UROLOGY

## 2021-01-19 PROCEDURE — 74400 UROGRAPHY IV +-KUB TOMOG: CPT

## 2021-01-19 PROCEDURE — C1758 CATHETER, URETERAL: HCPCS | Performed by: UROLOGY

## 2021-01-19 PROCEDURE — 83735 ASSAY OF MAGNESIUM: CPT

## 2021-01-19 PROCEDURE — 3600000013 HC SURGERY LEVEL 3 ADDTL 15MIN: Performed by: UROLOGY

## 2021-01-19 PROCEDURE — 3700000001 HC ADD 15 MINUTES (ANESTHESIA): Performed by: UROLOGY

## 2021-01-19 PROCEDURE — 2709999900 HC NON-CHARGEABLE SUPPLY: Performed by: UROLOGY

## 2021-01-19 PROCEDURE — 6360000004 HC RX CONTRAST MEDICATION: Performed by: UROLOGY

## 2021-01-19 PROCEDURE — 3600000003 HC SURGERY LEVEL 3 BASE: Performed by: UROLOGY

## 2021-01-19 PROCEDURE — 7100000000 HC PACU RECOVERY - FIRST 15 MIN: Performed by: UROLOGY

## 2021-01-19 PROCEDURE — 3700000000 HC ANESTHESIA ATTENDED CARE: Performed by: UROLOGY

## 2021-01-19 PROCEDURE — 99232 SBSQ HOSP IP/OBS MODERATE 35: CPT | Performed by: INTERNAL MEDICINE

## 2021-01-19 PROCEDURE — 36415 COLL VENOUS BLD VENIPUNCTURE: CPT

## 2021-01-19 PROCEDURE — 84100 ASSAY OF PHOSPHORUS: CPT

## 2021-01-19 DEVICE — STENT URET 6FR L24CM PERCFLX HYDR+ DBL PGTL THRD 2: Type: IMPLANTABLE DEVICE | Status: FUNCTIONAL

## 2021-01-19 RX ORDER — OXYCODONE HYDROCHLORIDE AND ACETAMINOPHEN 5; 325 MG/1; MG/1
1 TABLET ORAL EVERY 4 HOURS PRN
Status: DISCONTINUED | OUTPATIENT
Start: 2021-01-19 | End: 2021-01-20 | Stop reason: HOSPADM

## 2021-01-19 RX ORDER — DIPHENHYDRAMINE HCL 25 MG
50 TABLET ORAL DAILY
Status: DISCONTINUED | OUTPATIENT
Start: 2021-01-19 | End: 2021-01-20 | Stop reason: HOSPADM

## 2021-01-19 RX ORDER — LABETALOL HYDROCHLORIDE 5 MG/ML
5 INJECTION, SOLUTION INTRAVENOUS EVERY 10 MIN PRN
Status: DISCONTINUED | OUTPATIENT
Start: 2021-01-19 | End: 2021-01-19

## 2021-01-19 RX ORDER — ZINC SULFATE 50(220)MG
50 CAPSULE ORAL DAILY
Status: DISCONTINUED | OUTPATIENT
Start: 2021-01-19 | End: 2021-01-20 | Stop reason: HOSPADM

## 2021-01-19 RX ORDER — FENTANYL CITRATE 50 UG/ML
INJECTION, SOLUTION INTRAMUSCULAR; INTRAVENOUS PRN
Status: DISCONTINUED | OUTPATIENT
Start: 2021-01-19 | End: 2021-01-19 | Stop reason: SDUPTHER

## 2021-01-19 RX ORDER — SODIUM CHLORIDE, SODIUM LACTATE, POTASSIUM CHLORIDE, CALCIUM CHLORIDE 600; 310; 30; 20 MG/100ML; MG/100ML; MG/100ML; MG/100ML
INJECTION, SOLUTION INTRAVENOUS CONTINUOUS PRN
Status: DISCONTINUED | OUTPATIENT
Start: 2021-01-19 | End: 2021-01-19 | Stop reason: SDUPTHER

## 2021-01-19 RX ORDER — DEXAMETHASONE 4 MG/1
8 TABLET ORAL SEE ADMIN INSTRUCTIONS
Status: DISCONTINUED | OUTPATIENT
Start: 2021-01-19 | End: 2021-01-19 | Stop reason: SDUPTHER

## 2021-01-19 RX ORDER — ENALAPRIL MALEATE 10 MG/1
10 TABLET ORAL DAILY
Status: DISCONTINUED | OUTPATIENT
Start: 2021-01-19 | End: 2021-01-20 | Stop reason: HOSPADM

## 2021-01-19 RX ORDER — CEFAZOLIN SODIUM 2 G/50ML
SOLUTION INTRAVENOUS PRN
Status: DISCONTINUED | OUTPATIENT
Start: 2021-01-19 | End: 2021-01-19 | Stop reason: SDUPTHER

## 2021-01-19 RX ORDER — M-VIT,TX,IRON,MINS/CALC/FOLIC 27MG-0.4MG
1 TABLET ORAL DAILY
Status: DISCONTINUED | OUTPATIENT
Start: 2021-01-19 | End: 2021-01-20 | Stop reason: HOSPADM

## 2021-01-19 RX ORDER — CALCIUM CARBONATE 500(1250)
500 TABLET ORAL DAILY
Status: DISCONTINUED | OUTPATIENT
Start: 2021-01-19 | End: 2021-01-20 | Stop reason: HOSPADM

## 2021-01-19 RX ORDER — ONDANSETRON 2 MG/ML
INJECTION INTRAMUSCULAR; INTRAVENOUS PRN
Status: DISCONTINUED | OUTPATIENT
Start: 2021-01-19 | End: 2021-01-19 | Stop reason: SDUPTHER

## 2021-01-19 RX ORDER — SODIUM CHLORIDE, SODIUM LACTATE, POTASSIUM CHLORIDE, CALCIUM CHLORIDE 600; 310; 30; 20 MG/100ML; MG/100ML; MG/100ML; MG/100ML
INJECTION, SOLUTION INTRAVENOUS CONTINUOUS
Status: DISCONTINUED | OUTPATIENT
Start: 2021-01-19 | End: 2021-01-20 | Stop reason: HOSPADM

## 2021-01-19 RX ORDER — LIDOCAINE HYDROCHLORIDE 20 MG/ML
INJECTION, SOLUTION INTRAVENOUS PRN
Status: DISCONTINUED | OUTPATIENT
Start: 2021-01-19 | End: 2021-01-19 | Stop reason: SDUPTHER

## 2021-01-19 RX ORDER — MIDAZOLAM HYDROCHLORIDE 1 MG/ML
INJECTION INTRAMUSCULAR; INTRAVENOUS PRN
Status: DISCONTINUED | OUTPATIENT
Start: 2021-01-19 | End: 2021-01-19 | Stop reason: SDUPTHER

## 2021-01-19 RX ORDER — GABAPENTIN 300 MG/1
600 CAPSULE ORAL 2 TIMES DAILY
Status: DISCONTINUED | OUTPATIENT
Start: 2021-01-19 | End: 2021-01-19 | Stop reason: SDUPTHER

## 2021-01-19 RX ORDER — PROMETHAZINE HYDROCHLORIDE 25 MG/ML
25 INJECTION, SOLUTION INTRAMUSCULAR; INTRAVENOUS PRN
Status: DISCONTINUED | OUTPATIENT
Start: 2021-01-19 | End: 2021-01-20 | Stop reason: HOSPADM

## 2021-01-19 RX ORDER — PROPOFOL 10 MG/ML
INJECTION, EMULSION INTRAVENOUS CONTINUOUS PRN
Status: DISCONTINUED | OUTPATIENT
Start: 2021-01-19 | End: 2021-01-19 | Stop reason: SDUPTHER

## 2021-01-19 RX ORDER — MAGNESIUM OXIDE 400 MG/1
400 TABLET ORAL DAILY
Status: DISCONTINUED | OUTPATIENT
Start: 2021-01-19 | End: 2021-01-20 | Stop reason: HOSPADM

## 2021-01-19 RX ORDER — CEFAZOLIN SODIUM 2 G/50ML
SOLUTION INTRAVENOUS
Status: COMPLETED
Start: 2021-01-19 | End: 2021-01-19

## 2021-01-19 RX ORDER — PHENYLEPHRINE HYDROCHLORIDE 10 MG/ML
INJECTION INTRAVENOUS PRN
Status: DISCONTINUED | OUTPATIENT
Start: 2021-01-19 | End: 2021-01-19 | Stop reason: SDUPTHER

## 2021-01-19 RX ORDER — DEXAMETHASONE SODIUM PHOSPHATE 10 MG/ML
INJECTION, SOLUTION INTRAMUSCULAR; INTRAVENOUS PRN
Status: DISCONTINUED | OUTPATIENT
Start: 2021-01-19 | End: 2021-01-19 | Stop reason: SDUPTHER

## 2021-01-19 RX ORDER — MAGNESIUM SULFATE IN WATER 40 MG/ML
2 INJECTION, SOLUTION INTRAVENOUS ONCE
Status: COMPLETED | OUTPATIENT
Start: 2021-01-19 | End: 2021-01-19

## 2021-01-19 RX ORDER — LANOLIN ALCOHOL/MO/W.PET/CERES
1000 CREAM (GRAM) TOPICAL DAILY
Status: DISCONTINUED | OUTPATIENT
Start: 2021-01-19 | End: 2021-01-20 | Stop reason: HOSPADM

## 2021-01-19 RX ORDER — TAMSULOSIN HYDROCHLORIDE 0.4 MG/1
0.4 CAPSULE ORAL DAILY
Status: DISCONTINUED | OUTPATIENT
Start: 2021-01-19 | End: 2021-01-19 | Stop reason: SDUPTHER

## 2021-01-19 RX ORDER — MEPERIDINE HYDROCHLORIDE 25 MG/ML
12.5 INJECTION INTRAMUSCULAR; INTRAVENOUS; SUBCUTANEOUS EVERY 5 MIN PRN
Status: DISCONTINUED | OUTPATIENT
Start: 2021-01-19 | End: 2021-01-19

## 2021-01-19 RX ADMIN — TAMSULOSIN HYDROCHLORIDE 0.4 MG: 0.4 CAPSULE ORAL at 20:18

## 2021-01-19 RX ADMIN — MAGNESIUM SULFATE HEPTAHYDRATE 2 G: 40 INJECTION, SOLUTION INTRAVENOUS at 10:03

## 2021-01-19 RX ADMIN — GABAPENTIN 600 MG: 300 CAPSULE ORAL at 20:18

## 2021-01-19 RX ADMIN — PROPOFOL INJECTABLE EMULSION 100 MCG/KG/MIN: 10 INJECTION, EMULSION INTRAVENOUS at 12:03

## 2021-01-19 RX ADMIN — FENTANYL CITRATE 50 MCG: 50 INJECTION, SOLUTION INTRAMUSCULAR; INTRAVENOUS at 12:08

## 2021-01-19 RX ADMIN — SODIUM BICARBONATE 650 MG TABLET 1300 MG: at 20:18

## 2021-01-19 RX ADMIN — HEPARIN SODIUM 5000 UNITS: 5000 INJECTION INTRAVENOUS; SUBCUTANEOUS at 21:26

## 2021-01-19 RX ADMIN — CYANOCOBALAMIN TAB 1000 MCG 1000 MCG: 1000 TAB at 14:13

## 2021-01-19 RX ADMIN — IPRATROPIUM BROMIDE 0.5 MG: 0.5 SOLUTION RESPIRATORY (INHALATION) at 16:10

## 2021-01-19 RX ADMIN — ZINC SULFATE 220 MG (50 MG) CAPSULE 50 MG: CAPSULE at 14:12

## 2021-01-19 RX ADMIN — ENALAPRIL MALEATE 10 MG: 10 TABLET ORAL at 14:12

## 2021-01-19 RX ADMIN — PHENYLEPHRINE HYDROCHLORIDE 100 MCG: 10 INJECTION INTRAVENOUS at 12:18

## 2021-01-19 RX ADMIN — CEFAZOLIN SODIUM 2 G: 2 SOLUTION INTRAVENOUS at 12:01

## 2021-01-19 RX ADMIN — MAGNESIUM OXIDE 400 MG: 400 TABLET ORAL at 14:12

## 2021-01-19 RX ADMIN — MULTIPLE VITAMINS W/ MINERALS TAB 1 TABLET: TAB at 14:12

## 2021-01-19 RX ADMIN — MIDAZOLAM 2 MG: 1 INJECTION INTRAMUSCULAR; INTRAVENOUS at 12:00

## 2021-01-19 RX ADMIN — LIDOCAINE HYDROCHLORIDE 40 MG: 20 INJECTION, SOLUTION INTRAVENOUS at 12:03

## 2021-01-19 RX ADMIN — SODIUM CHLORIDE, POTASSIUM CHLORIDE, SODIUM LACTATE AND CALCIUM CHLORIDE: 600; 310; 30; 20 INJECTION, SOLUTION INTRAVENOUS at 15:43

## 2021-01-19 RX ADMIN — GABAPENTIN 600 MG: 300 CAPSULE ORAL at 08:01

## 2021-01-19 RX ADMIN — CARVEDILOL 12.5 MG: 6.25 TABLET, FILM COATED ORAL at 08:01

## 2021-01-19 RX ADMIN — DEXAMETHASONE SODIUM PHOSPHATE 4 MG: 4 INJECTION, SOLUTION INTRAMUSCULAR; INTRAVENOUS at 19:17

## 2021-01-19 RX ADMIN — FENTANYL CITRATE 50 MCG: 50 INJECTION, SOLUTION INTRAMUSCULAR; INTRAVENOUS at 12:22

## 2021-01-19 RX ADMIN — CARVEDILOL 12.5 MG: 6.25 TABLET, FILM COATED ORAL at 16:46

## 2021-01-19 RX ADMIN — SODIUM CHLORIDE, POTASSIUM CHLORIDE, SODIUM LACTATE AND CALCIUM CHLORIDE: 600; 310; 30; 20 INJECTION, SOLUTION INTRAVENOUS at 14:11

## 2021-01-19 RX ADMIN — HEPARIN SODIUM 5000 UNITS: 5000 INJECTION INTRAVENOUS; SUBCUTANEOUS at 14:13

## 2021-01-19 RX ADMIN — SODIUM CHLORIDE, POTASSIUM CHLORIDE, SODIUM LACTATE AND CALCIUM CHLORIDE: 600; 310; 30; 20 INJECTION, SOLUTION INTRAVENOUS at 12:00

## 2021-01-19 RX ADMIN — DEXAMETHASONE SODIUM PHOSPHATE 4 MG: 10 INJECTION, SOLUTION INTRAMUSCULAR; INTRAVENOUS at 12:30

## 2021-01-19 RX ADMIN — CALCIUM 500 MG: 500 TABLET ORAL at 14:23

## 2021-01-19 RX ADMIN — DIPHENHYDRAMINE HYDROCHLORIDE 50 MG: 25 TABLET ORAL at 14:12

## 2021-01-19 RX ADMIN — ONDANSETRON 4 MG: 2 INJECTION INTRAMUSCULAR; INTRAVENOUS at 12:30

## 2021-01-19 RX ADMIN — SODIUM CHLORIDE, PRESERVATIVE FREE 10 ML: 5 INJECTION INTRAVENOUS at 20:19

## 2021-01-19 RX ADMIN — SODIUM CHLORIDE, POTASSIUM CHLORIDE, SODIUM LACTATE AND CALCIUM CHLORIDE: 600; 310; 30; 20 INJECTION, SOLUTION INTRAVENOUS at 23:51

## 2021-01-19 ASSESSMENT — PULMONARY FUNCTION TESTS
PIF_VALUE: 0
PIF_VALUE: 1
PIF_VALUE: 0
PIF_VALUE: 1

## 2021-01-19 NOTE — PROGRESS NOTES
Attempted tx with pt, however pt OOR for cysto this afternoon. Will attempt tx with pt at later time/date.  Yaneth Todd, [address]

## 2021-01-19 NOTE — BRIEF OP NOTE
Brief Postoperative Note    Rhianna Pa  YOB: 1945  [id number]    Pre-operative Diagnosis: azotemia with left hydro     Post-operative Diagnosis: Same    Procedure: cysto retro stent    Anesthesia: MAC    Surgeons/Assistants: Cathy Chavez      Estimated Blood Loss: less than 50     Complications: None    Specimens: Was Not Obtained    Findings:     Electronically signed by Buddy Martinez MD on 1/19/2021 at 12:42 PM

## 2021-01-19 NOTE — PROGRESS NOTES
[address] [place], [place] [address]   Progress Note    Admitting Date and Time: 1/15/2021 10:02 AM  Admit Dx: BRITNI (acute kidney injury) ([facility] [address] [N17.9]    Subjective/interval history:    1/16: Pt feels okay but not enthused about Garcia catheter. 1/17: Patient eager to get home as soon as possible. 1/18: Patient denies any specific complaints, feels eager to go home. Lasix renal scan done this morning shows abnormally functioning left kidney with delayed slow washout suggesting high-grade obstruction. Creatinine stable at 3.2, potassium 4.1 this morning. 1/19: Patient seen and examined. States he is hungry since he is n.p.o. after midnight, otherwise denies any specific complaints. Creatinine has been steady, 3.3 this morning. Potassium 4.8 this morning. Hemoglobin stable.        sodium bicarbonate  1,300 mg Oral BID    ceFAZolin  2 g Intravenous On Call to OR    sodium chloride (PF)  0.9 mL Intravenous Once    aspirin  81 mg Oral Daily    atorvastatin  20 mg Oral Daily    carvedilol  12.5 mg Oral BID WC    dexamethasone  4 mg Oral Q24H    tamsulosin  0.4 mg Oral Nightly    gabapentin  600 mg Oral BID    sodium chloride flush  10 mL Intravenous 2 times per day    heparin (porcine)  5,000 Units Subcutaneous 3 times per day         ALPRAZolam, 0.5 mg, PRN      calcium carbonate, 1 tablet, Daily PRN      sodium chloride flush, 10 mL, PRN      promethazine, 12.5 mg, Q6H PRN    Or      ondansetron, 4 mg, Q6H PRN      polyethylene glycol, 17 g, Daily PRN      acetaminophen, 650 mg, Q6H PRN    Or      acetaminophen, 650 mg, Q6H PRN         Objective:    /65   Pulse 68   Temp 96.9 °F (36.1 °C) (Oral)   Resp 16   Ht 5' 7\" (1.702 m)   Wt 182 lb 14.4 oz (83 kg)   SpO2 95%   BMI 28.65 kg/m²   General Appearance: alert and oriented to person, place and time and in no acute distress  Skin: warm and dry  Head: normocephalic and atraumatic Eyes: pupils equal, round, and reactive to light, extraocular eye movements intact, conjunctivae normal  Neck: neck supple and non tender without mass   Pulmonary/Chest: clear to auscultation bilaterally- no wheezes, rales or rhonchi, normal air movement, no respiratory distress. Right chest wall with Mediport in place  Cardiovascular: normal rate, normal S1 and S2 and no carotid bruits  Abdomen: soft, non-tender, non-distended, normal bowel sounds, no masses or organomegaly  Extremities: Trace bilateral pedal edema. No cyanosis or clubbing. Neurologic: no cranial nerve deficit and speech normal      Recent Labs     01/17/21  1116 01/18/21  1200 01/19/21  0530    139 141   K 4.5 4.1 4.8    103 105   CO2 26 24 24   BUN 40* 49* 53*   CREATININE 3.3* 3.2* 3.3*   GLUCOSE 101* 104* 117*   CALCIUM 8.3* 8.2* 7.9*       No results for input(s): ALKPHOS, PROT, LABALBU, BILITOT, AST, ALT in the last 72 hours. Recent Labs     01/17/21  0449 01/18/21  0615 01/19/21  0530   WBC 4.0* 4.4* 3.8*   RBC 2.85* 2.91* 2.67*   HGB 8.5* 8.8* 8.2*   HCT 28.1* 28.7* 26.2*   MCV 98.6 98.6 98.1   MCH 29.8 30.2 30.7   MCHC 30.2* 30.7* 31.3*   RDW 18.6* 18.6* 18.6*    238 189   MPV 10.6 10.4 10.6       Iron and TIBC [[id number]] (Abnormal) Collected: 01/16/21 0444     Specimen: Blood Updated: 01/16/21 1139      Iron 32 mcg/dL       TIBC 196 mcg/dL       Iron Saturation 16 %      Ferritin [[id number]] Collected: 01/16/21 0444     Specimen: Blood Updated: 01/16/21 1139      Ferritin 1,896 ng/mL          Radiology:   NM RENAL WITH LASIX   Final Result   Normal flow and function of the right kidney without obstruction. Abnormally functioning left kidney with delayed slow washout following Lasix   administration, suggestive of high-grade obstruction. Correlation with   retrograde pyelography may be helpful. Split renal uptake is 65% on the left and 35% on the right.       CT ABDOMEN PELVIS WO CONTRAST   Final Result Moderate left and minimal right hydronephrosis, no sign of obstructing   calculus or mass, suspect bladder outlet obstruction   Small pleural effusions, may minimal free intraperitoneal fluid. Left lower lobe atelectasis      US RETROPERITONEAL COMPLETE   Final Result   Right kidney is mildly atrophic. Mild hydronephrosis of left kidney. Bladder is unremarkable. 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  *      Plan:

## 2021-01-19 NOTE — ANESTHESIA PRE PROCEDURE
clopidogrel (PLAVIX) 75 MG tablet Take 75 mg by mouth daily   Yes Historical Provider, MD   carvedilol (COREG) 25 MG tablet Take 12.5 mg by mouth daily    Yes Historical Provider, MD   atorvastatin (LIPITOR) 40 MG tablet Take 60 mg by mouth daily    Yes Historical Provider, MD   ALPRAZolam (XANAX) 0.5 MG tablet Take 0.5 mg by mouth 3 times daily as needed for Anxiety.     Yes Historical Provider, MD       Current medications:    Current Facility-Administered Medications   Medication Dose Route Frequency Provider Last Rate Last Admin    magnesium sulfate 2 g in 50 mL IVPB premix  2 g Intravenous Once Alexx Cai MD 25 mL/hr at 01/19/21 1003 2 g at 01/19/21 1003    sodium bicarbonate tablet 1,300 mg  1,300 mg Oral BID Alexx Cai MD        0.9 % sodium chloride infusion  1,000 mL Intravenous Continuous Alexx Cai MD 50 mL/hr at 01/18/21 1014 1,000 mL at 01/18/21 1014    sodium chloride (PF) 0.9 % injection 0.9 mL  0.9 mL Intravenous Once Shweta Prutet MD        ALPRAZolam Joeline Dock) tablet 0.5 mg  0.5 mg Oral PRN Osmin Sommers MD        aspirin EC tablet 81 mg  81 mg Oral Daily Osmin Sommers MD   81 mg at 01/18/21 1014    atorvastatin (LIPITOR) tablet 20 mg  20 mg Oral Daily Osmin Sommers MD   20 mg at 01/18/21 1015    calcium carbonate (TUMS) chewable tablet 500 mg  1 tablet Oral Daily PRN Osmin Sommers MD        carvedilol (COREG) tablet 12.5 mg  12.5 mg Oral BID WC Osmin Sommers MD   12.5 mg at 01/19/21 0801    dexamethasone (DECADRON) injection 4 mg  4 mg Oral Q24H Osmin Sommers MD   4 mg at 01/18/21 1841    tamsulosin (FLOMAX) capsule 0.4 mg  0.4 mg Oral Nightly Osmin Sommers MD   0.4 mg at 01/18/21 2117    gabapentin (NEURONTIN) capsule 600 mg  600 mg Oral BID Osmin Sommers MD   600 mg at 01/19/21 0801    sodium chloride flush 0.9 % injection 10 mL  10 mL Intravenous 2 times per day Osmin Sommers MD   10 mL at 01/15/21 9870 COVID-19 Screening (If Applicable): No results found for: COVID19      Anesthesia Evaluation  Patient summary reviewed no history of anesthetic complications:   Airway: Mallampati: II  TM distance: >3 FB   Neck ROM: full  Mouth opening: > = 3 FB Dental:      Comment: Many broken teeth    Pulmonary:Negative Pulmonary ROS breath sounds clear to auscultation                             Cardiovascular:    (+) hypertension:, past MI:, CAD:, CABG/stent:,       ECG reviewed  Rhythm: regular  Rate: normal                    Neuro/Psych:   Negative Neuro/Psych ROS              GI/Hepatic/Renal:   (+) renal disease (LEFT Hydronephrosis):,           Endo/Other:    (+) blood dyscrasia (HGB 8.2): anemia:., malignancy/cancer (Malignant neoplasm of lower lobe of left lung , Renal cancer ). Abdominal:           Vascular: negative vascular ROS. Anesthesia Plan      MAC     ASA 3       Induction: intravenous. Anesthetic plan and risks discussed with patient. Plan discussed with CRNA.                   Tanmay Elena MD   1/19/2021

## 2021-01-19 NOTE — OP NOTE
[address]                                OPERATIVE REPORT    PATIENT NAME: Sherrie Domínguze                :        1945  MED REC NO:   [id number]                            ROOM:  ACCOUNT NO:   [de-identified]                           ADMIT DATE: 01/15/2021  PROVIDER:     Mahad Liriano MD    DATE OF PROCEDURE:  2021    PREOPERATIVE DIAGNOSIS:  Left hydronephrosis. POSTOPERATIVE DIAGNOSIS:  Left hydronephrosis. OPERATION PERFORMED:  Cystopanendoscopy, retrograde pyelogram, left  ureteral stent. SURGEON:  Mahad Liriano MD    ANESTHESIA:  LMAC. ESTIMATED BLOOD LOSS:  Less than 50. OPERATION REPORT:  With the patient in the lithotomy position under  satisfactory sedation, the genitalia were prepped and draped in a  sterile manner. A 21-Libyan panendoscope passed easily through the  pendulous and membranous urethra. There were no strictures or lesions  seen. The prostatic fossa did not show any evidence of outlet  obstruction. However, upon entering his bladder, bladder was badly  trabeculated with diverticulum and cellule formation throughout the  mucosa appeared to be unremarkable. Trigone was symmetrical.  Ureteral  orifices of normal configuration and location. A retrograde on the  right side was unremarkable with good drainage, on the left side he  appeared to have a strictured area in the distal ureter. I was able to  pass a wire beyond this point. The upper tract appeared to be  hydronephrotic. A 24 cm, 6-Libyan double-J stent was passed, one end  curled in the renal pelvis, the other in the bladder. The bladder was  drained with this 14-Libyan Garcia. The patient tolerated the procedure  well, was sent to recovery room in satisfactory condition.         Nino Parker MD    D: 2021 30:44:16       T: 2021 13:01:29     [email or web address] Job#: [id number]     Doc#: [id number]    CC:

## 2021-01-19 NOTE — PROGRESS NOTES
[facility] Quality Flow/Interdisciplinary Rounds Progress Note        Quality Flow Rounds held on January 19, 2021    Disciplines Attending:  Bedside Nurse, ,  and Nursing Unit Leadership    Dimitry Hidalgo was admitted on 1/15/2021 10:02 AM    Anticipated Discharge Date:  Expected Discharge Date: 01/20/21    Disposition:    Lencho Score:  Lencho Scale Score: 20    Readmission Risk              Risk of Unplanned Readmission:        29           Discussed patient goal for the day, patient clinical progression, and barriers to discharge.   The following Goal(s) of the Day/Commitment(s) have been identified:  Activity progression, PT/OT, cysto today, IV F, RA, steffen Stubbs, From H with wife      Florian Bodily  January 19, 2021

## 2021-01-19 NOTE — PROGRESS NOTES
S: Patient states feeling well, just slight tenderness left side abdomen  O:  Temp 97 P-68 /65  Gen- alert, non distressed,talking  HEENT-mmm  Lungs- clear  Cardiac- RRR  abd- soft/nt/nd  Ext-warm and well perfused    Labs:creatinie 3.3 wbc=3.8 hb=8 kop=462    A/P:  62-year-old gentleman known to Dr. 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 He has had multiple treatments most recently had be started Cyramza and Docetaxel on 5/14/20, last tx on 1/7/2021. He is admitted with BRITNI and may have outlet obstruction on renal scan. Plan is for cystoscopy today with hopes of finding a correctable obstruction and improve his renal function to baseline. 1. F/u with Dr. Leon Constantino after discharge  2.  Cystoscopy today    Ronald Lutz

## 2021-01-19 NOTE — PROGRESS NOTES
Nephrology Progress Note  Patient's Name: Johan Lundberg  9:44 AM  1/19/2021    History of Present Ilness:    Johan Lundberg is a 70 y.o. 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 He was sent to the ER by the [facility] for abnormal labs. Per the patient he is receiving treatment for his cancer, he is unsure what is being given. His renal function has been abnormal recently and his most recent lab work prompted them to have him come to the ER. He states he began chemo aprox 2 weeks ago, he was receiving it 3 times per week. His baseline Cr in Dec 2020 was 1.46mg/dl. Labs upon arrival to the ER show Na 136, K 5.6, Cl 111, CO2 15, BUN 42, Cr 3.5. Hyperkalemia was treated medically in the ER. This morning his K remained elevated at 6.5, it was again treated medically. He was taking enalapril PTA, it is currently being held. DEBBI showed hydronephrosis. Upon assessment, patient just returned to his room from CT scan. He states he is feeling ok. He admits to having diarrhea for the past 2 weeks as well as decreased appetite. He denies any kidney stones or hematuria. Interval History:  1/19/21: Seen and examined in room. States he is feeling well except for feeling hungry. Past Medical History:   Diagnosis Date    Heart attack [facility]) 2009    Hypertension 2009    Lung cancer [facility]) 2017    Renal mass        Past Surgical History:   Procedure Laterality Date    APPENDECTOMY  1965    LOBECTOMY Left 05/2017       Family History   Problem Relation Age of Onset    Cancer Father 71        lung        reports that he quit smoking about 11 years ago. His smoking use included cigarettes. He has a 40.00 pack-year smoking history. He has never used smokeless tobacco. He reports that he does not drink alcohol or use drugs. Allergies:  Patient has no known allergies.     Current Medications:        sodium bicarbonate tablet 1,300 mg, BID   0.9 % sodium chloride infusion, Continuous      sodium chloride (PF) 0.9 % injection 0.9 mL, Once      ALPRAZolam (XANAX) tablet 0.5 mg, PRN      aspirin EC tablet 81 mg, Daily      atorvastatin (LIPITOR) tablet 20 mg, Daily      calcium carbonate (TUMS) chewable tablet 500 mg, Daily PRN      carvedilol (COREG) tablet 12.5 mg, BID WC      dexamethasone (DECADRON) injection 4 mg, Q24H      tamsulosin (FLOMAX) capsule 0.4 mg, Nightly      gabapentin (NEURONTIN) capsule 600 mg, BID      sodium chloride flush 0.9 % injection 10 mL, 2 times per day      sodium chloride flush 0.9 % injection 10 mL, PRN      promethazine (PHENERGAN) tablet 12.5 mg, Q6H PRN    Or      ondansetron (ZOFRAN) injection 4 mg, Q6H PRN      polyethylene glycol (GLYCOLAX) packet 17 g, Daily PRN      acetaminophen (TYLENOL) tablet 650 mg, Q6H PRN    Or      acetaminophen (TYLENOL) suppository 650 mg, Q6H PRN      heparin (porcine) injection 5,000 Units, 3 times per day        Review of Systems:   Pertinent items are noted in HPI. Physical exam:   Constitutional:    Vitals: VITALS:  /77   Pulse 61   Temp 97.8 °F (36.6 °C) (Oral)   Resp 18   Ht 5' 7\" (1.702 m)   Wt 182 lb 14.4 oz (83 kg)   SpO2 97%   BMI 28.65 kg/m²   24HR INTAKE/OUTPUT:      Intake/Output Summary (Last 24 hours) at 1/19/2021 0944  Last data filed at 1/19/2021 3737  Gross per 24 hour   Intake 1489.17 ml   Output 3025 ml   Net -1535.83 ml     URINARY CATHETER OUTPUT (Garcia):  Urethral Catheter Non-latex 16 fr-Output (mL): 775 mL  Skin: no rash, turgor wnl  Heent:  eomi, mmm  Neck: no bruits or jvd noted  Cardiovascular:  S1, S2 without m/r/g.  Right chest mediport  Respiratory: CTA B without w/r/r  Abdomen:  +bs, soft, nt, nd  Ext: neg lower extremity edema  Psychiatric: mood and affect appropriate  Musculoskeletal:  Rom, muscular strength intact    Data:   Labs:  CBC:   Lab Results   Component Value Date    WBC 3.8 01/19/2021    RBC 2.67 01/19/2021 HGB 8.2 01/19/2021    HCT 26.2 01/19/2021    MCV 98.1 01/19/2021    MCH 30.7 01/19/2021    MCHC 31.3 01/19/2021    RDW 18.6 01/19/2021     01/19/2021    MPV 10.6 01/19/2021     BMP:    Lab Results   Component Value Date     01/19/2021    K 4.8 01/19/2021    K 5.6 01/15/2021     01/19/2021    CO2 24 01/19/2021    BUN 53 01/19/2021    LABALBU 3.3 01/15/2021    CREATININE 3.3 01/19/2021    CALCIUM 7.9 01/19/2021    GFRAA 22 01/19/2021    LABGLOM 18 01/19/2021    GLUCOSE 117 01/19/2021     Ionized Calcium:  No results found for: IONCA  Magnesium:    Lab Results   Component Value Date    MG 1.6 01/19/2021     Phosphorus:    Lab Results   Component Value Date    PHOS 3.0 01/19/2021     VITAMIN B12: No components found for: B12  FOLATE:  No results found for: FOLATE  IRON:    Lab Results   Component Value Date    IRON 32 01/16/2021     Iron Saturation:  No components found for: PERCENTFE  TIBC:    Lab Results   Component Value Date    TIBC 196 01/16/2021     FERRITIN:    Lab Results   Component Value Date    FERRITIN 1,896 01/16/2021        Imaging:  CT ABDOMEN PELVIS WO CONTRAST [[id number]] Collected: 01/16/21 1018      Order Status: Completed Updated: 01/16/21 1038     Narrative:       EXAMINATION:   CT OF THE ABDOMEN AND PELVIS WITHOUT CONTRAST 1/16/2021 8:49 am   TECHNIQUE:   CT of the abdomen and pelvis was performed without the administration of   intravenous contrast. Multiplanar reformatted images are provided for review. Dose modulation, iterative reconstruction, and/or weight based adjustment of   the mA/kV was utilized to reduce the radiation dose to as low as reasonably   achievable. COMPARISON:   None. HISTORY:   ORDERING SYSTEM PROVIDED HISTORY: hydronephrosis   TECHNOLOGIST PROVIDED HISTORY:   Reason for exam:->hydronephrosis   FINDINGS:   Lower Chest: There is small pleural effusions posteriorly.  The left lung   base is moderately atelectatic. Organs:  The gallbladder is unremarkable.  The liver is normal for non   enhanced technique.  The adrenal glands are normal.  The spleen is intact and   demonstrates normal morphology and attenuation/echogenicity. Dana Living  pancreas   demonstrates normal morphology and attenuation/echogenicity. Dana Living left kidney   is moderately enlarged, there is moderate hydronephrosis but no sign of an   obstructing mass or calculus.  A 5 mm calcification in the central right   kidney may represent a calculus or vascular calcification.  A 3 cm cyst lies   in the upper pole of the right kidney.  The right kidney is minimally   hydronephrotic. Maudie Larve is a small hiatal hernia. GI/Bowel: The GI tract has normal course caliber and morphology.  There are   scattered colonic diverticuli. The appendix is not identified, there are no   secondary signs of appendicitis or right lower quadrant inflammation. Pelvis: The prostate is mildly hypertrophied. Bladder is unremarkable in   appearance. Peritoneum/Retroperitoneum: Minimal free fluid lies within the pelvis.  There   are no signs of pelvic or retro peritoneal lymphadenopathy. Vasculature: The abdominal aorta, IVC and their branches are normal aside   from mild aortic atherosclerotic vascular disease. Bones/Soft Tissues: The hips, bony pelvis, and lumbar spine appear   unremarkable.  The abdominal wall is normal aside from small fat containing   umbilical and inguinal hernias.     Impression:       Moderate left and minimal right hydronephrosis, no sign of obstructing   calculus or mass, suspect bladder outlet obstruction   Small pleural effusions, may minimal free intraperitoneal fluid.    Left lower lobe atelectasis     US RETROPERITONEAL COMPLETE [[id number]] Collected: 01/15/21 2149     Order Status: Completed Updated: 01/15/21 2215     Narrative:       EXAMINATION:   RETROPERITONEAL ULTRASOUND OF THE KIDNEYS AND URINARY BLADDER   1/15/2021   COMPARISON:   None   HISTORY: ORDERING SYSTEM PROVIDED HISTORY: BRITNI on CKD   TECHNOLOGIST PROVIDED HISTORY:   Reason for exam:->BRITNI on CKD   What reading provider will be dictating this exam?->CRC   FINDINGS:   Kidneys: The right kidney measures 8.7 x 4.7 x 4.3cm in length and the left kidney   measures 11 x 6.6 x 5.8 cm in length. Kidneys demonstrate normal cortical echogenicity.  There is mild   hydronephrosis within the left kidney.  No evidence of hydronephrosis or   intrarenal stones. Bladder:   Unremarkable appearance of the bladder.  No significant post void   residual.The urinary bladder appears unremarkable. Right urinary jet is   noted. Left urinary jet is not visible.     Impression:       Right kidney is mildly atrophic. Mild hydronephrosis of left kidney. Bladder is unremarkable. Left urinary jet is not visualized           Assessment and Plan  1. Stage II BRITNI sec to combined effects of the ACEI in the setting of the obstructive uropathy  CT abd with moderate hydro L and mild hydro R kidney  FeNa >1% supporting intrarenal origin  Bladder scan with 264mL noted  Cr 3.5->3.4  UO 1450mL in last 24 hours  PLAN: 1. Follow labs  2. Continue Garcia  3. Holding ACEI  4. For Cysto-Retro this PM      2. HTN  BP at goal <130/80  PLAN: 1. Continue current medications    3. Hyperkalemia in the setting of the BRITNI, obstructive uropathy and the ACEI  K+ 4.8 this AM   PLAN: 1. Follow K  2. Low K diet    4. NAG metabolic acidosis in the setting of the BRITNI and obstructive uropathy  HCO3 goal >=22  PLAN: 1. HCO3 at goal    5. Anemia in CKD and [facility] Ca with mets to kidney  Hgb below goal >10  Ferritin 1896 with an Iron Sat 16%  PLAN: 1. Defer to heme/onc    6. CKD G3A with baseline Cr 1.46mg/dl and eGFR 47ml/min    7. Lung cancer with mets to kidney  Follows with [facility]  Currently receiving Chemotherapy 3 times per week receiving Cyramza and Docetaxel  PLAN: 1. Defer to heme/onc    8. Hypomagnesemia  Mag 1.6  PLAN: 1.  Replace Mag IV 2. Repeat Mag in AM    Thank you Dr. Fred Coles III, DO for allowing us to participate in care of Corey Mary MD  9:44 AM  1/19/2021

## 2021-01-19 NOTE — CARE COORDINATION
1/19/21 1428 CM note: NO COVID TESTING. Patient currently in OR for cystoscopy with L stent placement. Hx lung Ca with mets to kidney, LLL lobectomy, has mediport, and gets chemo at [facility]. Patient has no hx HHC or ASHWIN. Discharge plan is home and patient does not anticipate any needs. Pts wife will provide transportation home.  Electronically signed by Alice Dean RN on 1/19/2021 at 2:31 PM

## 2021-01-19 NOTE — PROGRESS NOTES
Physical Therapy    [id number]    Patient unavailable for physical therapy treatment due to OOR this PM.

## 2021-01-19 NOTE — PROGRESS NOTES
Called patient on his room phone. I went over renal scan results with him. Made him aware of plans for cystoscopy, retrograde pyelogram, and left stent insertion tomorrow with Dr. Ilia Bro. Risks/benefits explained.

## 2021-01-20 VITALS
WEIGHT: 183.2 LBS | HEIGHT: 67 IN | DIASTOLIC BLOOD PRESSURE: 85 MMHG | RESPIRATION RATE: 18 BRPM | OXYGEN SATURATION: 97 % | BODY MASS INDEX: 28.75 KG/M2 | TEMPERATURE: 97.2 F | HEART RATE: 91 BPM | SYSTOLIC BLOOD PRESSURE: 120 MMHG

## 2021-01-20 LAB
ANION GAP SERPL CALCULATED.3IONS-SCNC: 10 MMOL/L (ref 7–16)
BUN BLDV-MCNC: 49 MG/DL (ref 8–23)
CALCIUM SERPL-MCNC: 8.1 MG/DL (ref 8.6–10.2)
CHLORIDE BLD-SCNC: 103 MMOL/L (ref 98–107)
CO2: 23 MMOL/L (ref 22–29)
CREAT SERPL-MCNC: 3.2 MG/DL (ref 0.7–1.2)
GFR AFRICAN AMERICAN: 23
GFR NON-AFRICAN AMERICAN: 19 ML/MIN/1.73
GLUCOSE BLD-MCNC: 124 MG/DL (ref 74–99)
HCT VFR BLD CALC: 27.2 % (ref 37–54)
HEMOGLOBIN: 8.4 G/DL (ref 12.5–16.5)
MAGNESIUM: 1.8 MG/DL (ref 1.6–2.6)
MCH RBC QN AUTO: 30.8 PG (ref 26–35)
MCHC RBC AUTO-ENTMCNC: 30.9 % (ref 32–34.5)
MCV RBC AUTO: 99.6 FL (ref 80–99.9)
PDW BLD-RTO: 19 FL (ref 11.5–15)
PHOSPHORUS: 3.1 MG/DL (ref 2.5–4.5)
PLATELET # BLD: 202 E9/L (ref 130–450)
PMV BLD AUTO: 11.1 FL (ref 7–12)
POTASSIUM SERPL-SCNC: 4.9 MMOL/L (ref 3.5–5)
RBC # BLD: 2.73 E12/L (ref 3.8–5.8)
SODIUM BLD-SCNC: 136 MMOL/L (ref 132–146)
WBC # BLD: 4.6 E9/L (ref 4.5–11.5)

## 2021-01-20 PROCEDURE — 6360000002 HC RX W HCPCS: Performed by: UROLOGY

## 2021-01-20 PROCEDURE — 80048 BASIC METABOLIC PNL TOTAL CA: CPT

## 2021-01-20 PROCEDURE — 99239 HOSP IP/OBS DSCHRG MGMT >30: CPT | Performed by: INTERNAL MEDICINE

## 2021-01-20 PROCEDURE — 2580000003 HC RX 258: Performed by: UROLOGY

## 2021-01-20 PROCEDURE — 6370000000 HC RX 637 (ALT 250 FOR IP): Performed by: UROLOGY

## 2021-01-20 PROCEDURE — 84100 ASSAY OF PHOSPHORUS: CPT

## 2021-01-20 PROCEDURE — 97530 THERAPEUTIC ACTIVITIES: CPT

## 2021-01-20 PROCEDURE — 85027 COMPLETE CBC AUTOMATED: CPT

## 2021-01-20 PROCEDURE — 36592 COLLECT BLOOD FROM PICC: CPT

## 2021-01-20 PROCEDURE — 36415 COLL VENOUS BLD VENIPUNCTURE: CPT

## 2021-01-20 PROCEDURE — 83735 ASSAY OF MAGNESIUM: CPT

## 2021-01-20 PROCEDURE — 94640 AIRWAY INHALATION TREATMENT: CPT

## 2021-01-20 RX ORDER — OXYCODONE HYDROCHLORIDE AND ACETAMINOPHEN 5; 325 MG/1; MG/1
1 TABLET ORAL EVERY 4 HOURS PRN
Qty: 18 TABLET | Refills: 0 | Status: SHIPPED | OUTPATIENT
Start: 2021-01-20 | End: 2021-01-23

## 2021-01-20 RX ORDER — TAMSULOSIN HYDROCHLORIDE 0.4 MG/1
0.4 CAPSULE ORAL DAILY
Qty: 30 CAPSULE | Refills: 0 | Status: SHIPPED | OUTPATIENT
Start: 2021-01-20

## 2021-01-20 RX ORDER — SODIUM BICARBONATE 650 MG/1
1300 TABLET ORAL 2 TIMES DAILY
Qty: 120 TABLET | Refills: 0 | Status: SHIPPED | OUTPATIENT
Start: 2021-01-20

## 2021-01-20 RX ADMIN — MULTIPLE VITAMINS W/ MINERALS TAB 1 TABLET: TAB at 08:32

## 2021-01-20 RX ADMIN — HEPARIN SODIUM 5000 UNITS: 5000 INJECTION INTRAVENOUS; SUBCUTANEOUS at 05:31

## 2021-01-20 RX ADMIN — MAGNESIUM OXIDE 400 MG: 400 TABLET ORAL at 08:33

## 2021-01-20 RX ADMIN — IPRATROPIUM BROMIDE 0.5 MG: 0.5 SOLUTION RESPIRATORY (INHALATION) at 09:47

## 2021-01-20 RX ADMIN — SODIUM CHLORIDE, POTASSIUM CHLORIDE, SODIUM LACTATE AND CALCIUM CHLORIDE: 600; 310; 30; 20 INJECTION, SOLUTION INTRAVENOUS at 07:54

## 2021-01-20 RX ADMIN — CARVEDILOL 12.5 MG: 6.25 TABLET, FILM COATED ORAL at 08:34

## 2021-01-20 RX ADMIN — IPRATROPIUM BROMIDE 0.5 MG: 0.5 SOLUTION RESPIRATORY (INHALATION) at 06:42

## 2021-01-20 RX ADMIN — ASPIRIN 81 MG: 81 TABLET, COATED ORAL at 08:34

## 2021-01-20 RX ADMIN — SODIUM BICARBONATE 650 MG TABLET 1300 MG: at 08:33

## 2021-01-20 RX ADMIN — ATORVASTATIN CALCIUM 20 MG: 20 TABLET, FILM COATED ORAL at 08:32

## 2021-01-20 RX ADMIN — ZINC SULFATE 220 MG (50 MG) CAPSULE 50 MG: CAPSULE at 08:32

## 2021-01-20 RX ADMIN — ENALAPRIL MALEATE 10 MG: 10 TABLET ORAL at 08:33

## 2021-01-20 RX ADMIN — DIPHENHYDRAMINE HYDROCHLORIDE 50 MG: 25 TABLET ORAL at 08:32

## 2021-01-20 RX ADMIN — GABAPENTIN 600 MG: 300 CAPSULE ORAL at 08:32

## 2021-01-20 RX ADMIN — CYANOCOBALAMIN TAB 1000 MCG 1000 MCG: 1000 TAB at 08:32

## 2021-01-20 RX ADMIN — CALCIUM 500 MG: 500 TABLET ORAL at 11:58

## 2021-01-20 NOTE — DISCHARGE SUMMARY
[facility] Physician Discharge Summary       Jabier Jose MD  [address] [facility] [place] [address]  [phone]      for follow up appointment      Activity level: Resume normal activity    Diet: DIET CARDIAC; Low Potassium    Labs: BMP on 1/25/2021  Condition at discharge: Stable  Dispo: Home      Patient ID:  Gwen Briones  [id number]  70 y.o.  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 *      Consults:  IP CONSULT TO NEPHROLOGY  IP CONSULT TO HEM/ONC  IP CONSULT TO UROLOGY    Procedures: Cystoscopy with retrograde pyelogram and left ureteral stent placement 1/19/2021. 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 He was admitted to the hospital for worsening acute kidney injury that was found on outpatient blood work. He was admitted to the hospital and treated with IV fluids without much improvement in his kidney function. He also had bilateral hydronephrosis imaging, so urology was consulted. Follow-up renal Lasix scan suggested obstruction of the left urinary tract. He had cystoscopy with retrograde pyelogram and stent placement on 1/19/2020. Per nephrology and urology, patient okay for discharge to home with outpatient follow-up on 1/20/2021. Discharge Exam:  Vitals:    01/19/21 1915 01/19/21 2350 01/20/21 0509 01/20/21 0745   BP: 102/66 122/69  120/85   Pulse: 73 69  91   Resp: 18 18  18   Temp: 97.8 °F (36.6 °C) 97.8 °F (36.6 °C)  97.2 °F (36.2 °C)   TempSrc: Oral Oral  Oral   SpO2: 93% 98%  97%   Weight:   183 lb 3.2 oz (83.1 kg)    Height:           General Appearance: alert and oriented to person, place and time and in no acute distress  Skin: warm and dry  Head: normocephalic and atraumatic  Eyes: pupils equal, round, and reactive to light, extraocular eye movements intact, conjunctivae normal  Neck: neck supple and non tender without mass   Pulmonary/Chest: clear to auscultation bilaterally- no wheezes, rales or rhonchi, normal air movement, no respiratory distress. Right chest wall with Mediport in place  Cardiovascular: normal rate, normal S1 and S2 and no carotid bruits  Abdomen: soft, non-tender, non-distended, normal bowel sounds, no masses or organomegaly  Extremities: Trace bilateral pedal edema. No cyanosis or clubbing. Neurologic: no cranial nerve deficit and speech normal  I/O last 3 completed shifts: In: 840 [P.O.:240;  I.V.:600]  Out: 1350 [[id number]]  I/O this shift:  In: 360 [P.O.:360]  Out: -       LABS: Recent Labs     01/18/21  1200 01/19/21  0530 01/20/21  0546    141 136   K 4.1 4.8 4.9    105 103   CO2 24 24 23   BUN 49* 53* 49*   CREATININE 3.2* 3.3* 3.2*   GLUCOSE 104* 117* 124*   CALCIUM 8.2* 7.9* 8.1*       Recent Labs     01/18/21  0615 01/19/21  0530 01/20/21  0546   WBC 4.4* 3.8* 4.6   RBC 2.91* 2.67* 2.73*   HGB 8.8* 8.2* 8.4*   HCT 28.7* 26.2* 27.2*   MCV 98.6 98.1 99.6   MCH 30.2 30.7 30.8   MCHC 30.7* 31.3* 30.9*   RDW 18.6* 18.6* 19.0*    189 202   MPV 10.4 10.6 11.1       No results for input(s): POCGLU in the last 72 hours.     CBC:   Lab Results   Component Value Date    WBC 4.6 01/20/2021    RBC 2.73 01/20/2021    HGB 8.4 01/20/2021    HCT 27.2 01/20/2021    MCV 99.6 01/20/2021    MCH 30.8 01/20/2021    MCHC 30.9 01/20/2021    RDW 19.0 01/20/2021     01/20/2021    MPV 11.1 01/20/2021     BMP:    Lab Results   Component Value Date     01/20/2021    K 4.9 01/20/2021    K 5.6 01/15/2021     01/20/2021    CO2 23 01/20/2021    BUN 49 01/20/2021    LABALBU 3.3 01/15/2021    CREATININE 3.2 01/20/2021    CALCIUM 8.1 01/20/2021    GFRAA 23 01/20/2021    LABGLOM 19 01/20/2021    GLUCOSE 124 01/20/2021       Imaging:  Ct Abdomen Pelvis Wo Contrast    Result Date: 1/16/2021 EXAMINATION: CT OF THE ABDOMEN AND PELVIS WITHOUT CONTRAST 1/16/2021 8:49 am TECHNIQUE: CT of the abdomen and pelvis was performed without the administration of intravenous contrast. Multiplanar reformatted images are provided for review. Dose modulation, iterative reconstruction, and/or weight based adjustment of the mA/kV was utilized to reduce the radiation dose to as low as reasonably achievable. COMPARISON: None. HISTORY: ORDERING SYSTEM PROVIDED HISTORY: hydronephrosis TECHNOLOGIST PROVIDED HISTORY: Reason for exam:->hydronephrosis FINDINGS: Lower Chest: There is small pleural effusions posteriorly. The left lung base is moderately atelectatic. Organs: The gallbladder is unremarkable. The liver is normal for non enhanced technique. The adrenal glands are normal.  The spleen is intact and demonstrates normal morphology and attenuation/echogenicity. The  pancreas demonstrates normal morphology and attenuation/echogenicity. The left kidney is moderately enlarged, there is moderate hydronephrosis but no sign of an obstructing mass or calculus. A 5 mm calcification in the central right kidney may represent a calculus or vascular calcification. A 3 cm cyst lies in the upper pole of the right kidney. The right kidney is minimally hydronephrotic. There is a small hiatal hernia. GI/Bowel: The GI tract has normal course caliber and morphology. There are scattered colonic diverticuli. The appendix is not identified, there are no secondary signs of appendicitis or right lower quadrant inflammation. Pelvis: The prostate is mildly hypertrophied. Bladder is unremarkable in appearance. Peritoneum/Retroperitoneum: Minimal free fluid lies within the pelvis. There are no signs of pelvic or retro peritoneal lymphadenopathy. Vasculature: The abdominal aorta, IVC and their branches are normal aside from mild aortic atherosclerotic vascular disease. Bones/Soft Tissues:  The hips, bony pelvis, and lumbar spine appear unremarkable. The abdominal wall is normal aside from small fat containing umbilical and inguinal hernias. Moderate left and minimal right hydronephrosis, no sign of obstructing calculus or mass, suspect bladder outlet obstruction Small pleural effusions, may minimal free intraperitoneal fluid. Left lower lobe atelectasis    Us Retroperitoneal Complete    Result Date: 1/15/2021  EXAMINATION: RETROPERITONEAL ULTRASOUND OF THE KIDNEYS AND URINARY BLADDER 1/15/2021 COMPARISON: None HISTORY: ORDERING SYSTEM PROVIDED HISTORY: BRITNI on CKD TECHNOLOGIST PROVIDED HISTORY: Reason for exam:->BRITNI on CKD What reading provider will be dictating this exam?->CRC FINDINGS: Kidneys: The right kidney measures 8.7 x 4.7 x 4.3cm in length and the left kidney measures 11 x 6.6 x 5.8 cm in length. Kidneys demonstrate normal cortical echogenicity. There is mild hydronephrosis within the left kidney. No evidence of hydronephrosis or intrarenal stones. Bladder: Unremarkable appearance of the bladder. No significant post void residual.The urinary bladder appears unremarkable. Right urinary jet is noted. Left urinary jet is not visible. Right kidney is mildly atrophic. Mild hydronephrosis of left kidney. Bladder is unremarkable. Left urinary jet is not visualized        Patient Instructions:   Current Discharge Medication List      START taking these medications    Details   oxyCODONE-acetaminophen (PERCOCET) 5-325 MG per tablet Take 1 tablet by mouth every 4 hours as needed for Pain for up to 3 days.   Qty: 18 tablet, Refills: 0    Comments: Reduce doses taken as pain becomes manageable  Associated Diagnoses: Kidney stone      sodium bicarbonate 650 MG tablet Take 2 tablets by mouth 2 times daily  Qty: 120 tablet, Refills: 0         CONTINUE these medications which have NOT CHANGED    Details   Umeclidinium Bromide (INCRUSE ELLIPTA) 62.5 MCG/INH AEPB Inhale 1 puff into the lungs daily calcium carbonate (OSCAL) 500 MG TABS tablet Take 500 mg by mouth daily      Magnesium 400 MG TABS Take 400 mg by mouth daily      zinc 50 MG TABS tablet Take 50 mg by mouth daily      vitamin B-12 (CYANOCOBALAMIN) 1000 MCG tablet Take 1,000 mcg by mouth daily      Multiple Vitamins-Minerals (THERAPEUTIC MULTIVITAMIN-MINERALS) tablet Take 1 tablet by mouth daily      gabapentin (NEURONTIN) 300 MG capsule Take 600 mg by mouth 2 times daily. diphenhydrAMINE (BENADRYL) 25 MG tablet Take 50 mg by mouth daily      tamsulosin (FLOMAX) 0.4 MG capsule Take 0.4 mg by mouth daily      acetaminophen (TYLENOL) 500 MG tablet Take 1,000 mg by mouth every 6 hours as needed for Pain      dexamethasone (DECADRON) 4 MG tablet Take 8 mg by mouth See Admin Instructions Take 2 tables BID, day before, the day of, and the day after chemotherapy. enalapril (VASOTEC) 20 MG tablet Take 10 mg by mouth daily      aspirin 81 MG tablet Take 81 mg by mouth daily      clopidogrel (PLAVIX) 75 MG tablet Take 75 mg by mouth daily      carvedilol (COREG) 25 MG tablet Take 12.5 mg by mouth daily       atorvastatin (LIPITOR) 40 MG tablet Take 60 mg by mouth daily       ALPRAZolam (XANAX) 0.5 MG tablet Take 0.5 mg by mouth 3 times daily as needed for Anxiety. STOP taking these medications       calcium carbonate (TUMS) 500 MG chewable tablet Comments:   Reason for Stopping:                 Note that greater than 30 minutes was spent in preparing discharge papers, discussing discharge with patient, medication review, etc.    NOTE: This report was transcribed using voice recognition software. Every effort was made to ensure accuracy; however, inadvertent computerized transcription errors may be present.      Signed:  Electronically signed by Sam Ochoa DO on 1/20/2021 at 12:10 PM

## 2021-01-20 NOTE — CARE COORDINATION
1/20/21 1521 CM note: NO COVID TESTING. Discharge order noted. Met with patient at the bedside to discuss discharge planning. Discharge plan remains home and patient declines any discharge needs. Per RN, Lisha Hugo, patient needs to complete voiding trial prior to his discharge. Pts wife will provide transportation home.  Electronically signed by Leola Flannery RN on 1/20/2021 at 3:28 PM

## 2021-01-20 NOTE — PROGRESS NOTES
Nephrology Progress Note  Patient's Name: Ciara Iglesias  10:22 AM  1/20/2021    History of Present Ilness:    Ciara Iglesias is a 70 y.o. 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 He was sent to the ER by the [facility] for abnormal labs. Per the patient he is receiving treatment for his cancer, he is unsure what is being given. His renal function has been abnormal recently and his most recent lab work prompted them to have him come to the ER. He states he began chemo aprox 2 weeks ago, he was receiving it 3 times per week. His baseline Cr in Dec 2020 was 1.46mg/dl. Labs upon arrival to the ER show Na 136, K 5.6, Cl 111, CO2 15, BUN 42, Cr 3.5. Hyperkalemia was treated medically in the ER. This morning his K remained elevated at 6.5, it was again treated medically. He was taking enalapril PTA, it is currently being held. DEBBI showed hydronephrosis. Upon assessment, patient just returned to his room from CT scan. He states he is feeling ok. He admits to having diarrhea for the past 2 weeks as well as decreased appetite. He denies any kidney stones or hematuria. Interval History:  1/20/21: Seen and examined in room. States he is feeling well and wants to go home    Past Medical History:   Diagnosis Date    Heart attack [facility]) 2009    Hypertension 2009    Lung cancer [facility]) 2017    Renal mass        Past Surgical History:   Procedure Laterality Date    APPENDECTOMY  1965    LOBECTOMY Left 05/2017       Family History   Problem Relation Age of Onset    Cancer Father 71        lung        reports that he quit smoking about 11 years ago. His smoking use included cigarettes. He has a 40.00 pack-year smoking history. He has never used smokeless tobacco. He reports that he does not drink alcohol or use drugs. Allergies:  Patient has no known allergies.     Current Medications:        promethazine (PHENERGAN) injection 25 mg, PRN   ipratropium (ATROVENT) 0.02 % nebulizer solution 0.5 mg, TID      vitamin B-12 (CYANOCOBALAMIN) tablet 1,000 mcg, Daily      zinc sulfate (ZINCATE) capsule 50 mg, Daily      therapeutic multivitamin-minerals 1 tablet, Daily      magnesium oxide (MAG-OX) tablet 400 mg, Daily      enalapril (VASOTEC) tablet 10 mg, Daily      diphenhydrAMINE (BENADRYL) tablet 50 mg, Daily      calcium elemental (OSCAL) tablet 500 mg, Daily      lactated ringers infusion, Continuous      oxyCODONE-acetaminophen (PERCOCET) 5-325 MG per tablet 1 tablet, Q4H PRN      sodium bicarbonate tablet 1,300 mg, BID      0.9 % sodium chloride infusion, Continuous      sodium chloride (PF) 0.9 % injection 0.9 mL, Once      ALPRAZolam (XANAX) tablet 0.5 mg, PRN      aspirin EC tablet 81 mg, Daily      atorvastatin (LIPITOR) tablet 20 mg, Daily      calcium carbonate (TUMS) chewable tablet 500 mg, Daily PRN      carvedilol (COREG) tablet 12.5 mg, BID WC      dexamethasone (DECADRON) injection 4 mg, Q24H      tamsulosin (FLOMAX) capsule 0.4 mg, Nightly      gabapentin (NEURONTIN) capsule 600 mg, BID      sodium chloride flush 0.9 % injection 10 mL, 2 times per day      sodium chloride flush 0.9 % injection 10 mL, PRN      promethazine (PHENERGAN) tablet 12.5 mg, Q6H PRN    Or      ondansetron (ZOFRAN) injection 4 mg, Q6H PRN      polyethylene glycol (GLYCOLAX) packet 17 g, Daily PRN      acetaminophen (TYLENOL) tablet 650 mg, Q6H PRN    Or      acetaminophen (TYLENOL) suppository 650 mg, Q6H PRN      heparin (porcine) injection 5,000 Units, 3 times per day        Review of Systems:   Pertinent items are noted in HPI.     Physical exam:   Constitutional:    Vitals: VITALS:  /85   Pulse 91   Temp 97.2 °F (36.2 °C) (Oral)   Resp 18   Ht 5' 7\" (1.702 m)   Wt 183 lb 3.2 oz (83.1 kg)   SpO2 97%   BMI 28.69 kg/m²   24HR INTAKE/OUTPUT:      Intake/Output Summary (Last 24 hours) at 1/20/2021 1027 Last data filed at 1/20/2021 0504  Gross per 24 hour   Intake 840 ml   Output 1350 ml   Net -510 ml     URINARY CATHETER OUTPUT (Garcia):  Urethral Catheter 14 fr-Output (mL): 450 mL  [REMOVED] Urethral Catheter Non-latex 16 fr-Output (mL): 775 mL  Skin: no rash, turgor wnl  Heent:  eomi, mmm  Neck: no bruits or jvd noted  Cardiovascular:  S1, S2 without m/r/g.  Right chest mediport  Respiratory: CTA B without w/r/r  Abdomen:  +bs, soft, nt, nd  Ext: neg lower extremity edema  Psychiatric: mood and affect appropriate  Musculoskeletal:  Rom, muscular strength intact    Data:   Labs:  CBC:   Lab Results   Component Value Date    WBC 4.6 01/20/2021    RBC 2.73 01/20/2021    HGB 8.4 01/20/2021    HCT 27.2 01/20/2021    MCV 99.6 01/20/2021    MCH 30.8 01/20/2021    MCHC 30.9 01/20/2021    RDW 19.0 01/20/2021     01/20/2021    MPV 11.1 01/20/2021     BMP:    Lab Results   Component Value Date     01/20/2021    K 4.9 01/20/2021    K 5.6 01/15/2021     01/20/2021    CO2 23 01/20/2021    BUN 49 01/20/2021    LABALBU 3.3 01/15/2021    CREATININE 3.2 01/20/2021    CALCIUM 8.1 01/20/2021    GFRAA 23 01/20/2021    LABGLOM 19 01/20/2021    GLUCOSE 124 01/20/2021     Ionized Calcium:  No results found for: IONCA  Magnesium:    Lab Results   Component Value Date    MG 1.8 01/20/2021     Phosphorus:    Lab Results   Component Value Date    PHOS 3.1 01/20/2021     VITAMIN B12: No components found for: B12  FOLATE:  No results found for: FOLATE  IRON:    Lab Results   Component Value Date    IRON 32 01/16/2021     Iron Saturation:  No components found for: PERCENTFE  TIBC:    Lab Results   Component Value Date    TIBC 196 01/16/2021     FERRITIN:    Lab Results   Component Value Date    FERRITIN 1,896 01/16/2021        Imaging:  CT ABDOMEN PELVIS WO CONTRAST [[id number]] Collected: 01/16/21 1018      Order Status: Completed Updated: 01/16/21 1038     Narrative:       EXAMINATION: CT OF THE ABDOMEN AND PELVIS WITHOUT CONTRAST 1/16/2021 8:49 am   TECHNIQUE:   CT of the abdomen and pelvis was performed without the administration of   intravenous contrast. Multiplanar reformatted images are provided for review. Dose modulation, iterative reconstruction, and/or weight based adjustment of   the mA/kV was utilized to reduce the radiation dose to as low as reasonably   achievable. COMPARISON:   None. HISTORY:   ORDERING SYSTEM PROVIDED HISTORY: hydronephrosis   TECHNOLOGIST PROVIDED HISTORY:   Reason for exam:->hydronephrosis   FINDINGS:   Lower Chest: There is small pleural effusions posteriorly.  The left lung   base is moderately atelectatic. Organs:  The gallbladder is unremarkable.  The liver is normal for non   enhanced technique.  The adrenal glands are normal.  The spleen is intact and   demonstrates normal morphology and attenuation/echogenicity. Daya Kays  pancreas   demonstrates normal morphology and attenuation/echogenicity. Daya Kays left kidney   is moderately enlarged, there is moderate hydronephrosis but no sign of an   obstructing mass or calculus.  A 5 mm calcification in the central right   kidney may represent a calculus or vascular calcification.  A 3 cm cyst lies   in the upper pole of the right kidney.  The right kidney is minimally   hydronephrotic. Dayan Files is a small hiatal hernia. GI/Bowel: The GI tract has normal course caliber and morphology.  There are   scattered colonic diverticuli. The appendix is not identified, there are no   secondary signs of appendicitis or right lower quadrant inflammation. Pelvis: The prostate is mildly hypertrophied. Bladder is unremarkable in   appearance. Peritoneum/Retroperitoneum: Minimal free fluid lies within the pelvis.  There   are no signs of pelvic or retro peritoneal lymphadenopathy. Vasculature: The abdominal aorta, IVC and their branches are normal aside   from mild aortic atherosclerotic vascular disease. Bones/Soft Tissues: The hips, bony pelvis, and lumbar spine appear   unremarkable.  The abdominal wall is normal aside from small fat containing   umbilical and inguinal hernias.     Impression:       Moderate left and minimal right hydronephrosis, no sign of obstructing   calculus or mass, suspect bladder outlet obstruction   Small pleural effusions, may minimal free intraperitoneal fluid. Left lower lobe atelectasis     US RETROPERITONEAL COMPLETE [[id number]] Collected: 01/15/21 2149     Order Status: Completed Updated: 01/15/21 2215     Narrative:       EXAMINATION:   RETROPERITONEAL ULTRASOUND OF THE KIDNEYS AND URINARY BLADDER   1/15/2021   COMPARISON:   None   HISTORY:   ORDERING SYSTEM PROVIDED HISTORY: BRITNI on CKD   TECHNOLOGIST PROVIDED HISTORY:   Reason for exam:->BRITNI on CKD   What reading provider will be dictating this exam?->CRC   FINDINGS:   Kidneys: The right kidney measures 8.7 x 4.7 x 4.3cm in length and the left kidney   measures 11 x 6.6 x 5.8 cm in length. Kidneys demonstrate normal cortical echogenicity.  There is mild   hydronephrosis within the left kidney.  No evidence of hydronephrosis or   intrarenal stones. Bladder:   Unremarkable appearance of the bladder.  No significant post void   residual.The urinary bladder appears unremarkable. Right urinary jet is   noted. Left urinary jet is not visible.     Impression:       Right kidney is mildly atrophic. Mild hydronephrosis of left kidney. Bladder is unremarkable. Left urinary jet is not visualized           Assessment and Plan  1. Stage II BRITNI sec to combined effects of the ACEI in the setting of the obstructive uropathy  CT abd with moderate hydro L and mild hydro R kidney  FeNa >1% supporting intrarenal origin  Bladder scan with 264mL noted  Cr 3.5->3.4->3.2mg/dl  UO 1350mL in last 24 hours  1/19/21 S/P cystoscopy, retrograde pyelogram, left ureteral stent placement   PLAN: 1. Follow labs as an out pt   2.  Holding ACEI 2. HTN  BP at goal <130/80  PLAN: 1. Continue current medications    3. Hyperkalemia in the setting of the BRITNI, obstructive uropathy and the ACEI  K+ 4.9 this AM   PLAN: 1. Follow K  2. Low K diet    4. NAG metabolic acidosis in the setting of the BRITNI and obstructive uropathy  HCO3 goal >=22  PLAN: 1. HCO3 at goal    5. Anemia in CKD and Lung Ca with mets to kidney  Hgb below goal >10  Ferritin 1896 with an Iron Sat 16%  PLAN: 1. Defer to heme/onc    6. CKD G3A with baseline Cr 1.46mg/dl and eGFR 47ml/min    7. Lung cancer with mets to kidney  Follows with [facility]  Currently receiving Chemotherapy 3 times per week receiving Cyramza and Docetaxel  PLAN: 1. Defer to heme/onc    8. Hypomagnesemia  Mag 1.8  PLAN: 1.  Follow Mg++    OK for D/C from a Renal point of view-will ,follow up as an outpt    Thank you Dr. Jesus Amezcua III, DO for allowing us to participate in care of Fidelia Marcial MD  10:22 AM  1/20/2021

## 2021-01-20 NOTE — PROGRESS NOTES
Patient urinated without using his urinal post bourne removal. He said that it was a large amount of urine. Patient was told to notify me when he urinates again. Patient voided 150mL. Bladder scan was done and bladder only had 111mL urine in bladder. Will discharge home.

## 2021-01-20 NOTE — PROGRESS NOTES
Extremities: no cyanosis, clubbing or edema         Labs:     Recent Labs     01/20/21  0546      K 4.9      CO2 23   BUN 49*   CREATININE 3.2*   GLUCOSE 124*   CALCIUM 8.1*       Lab Results   Component Value Date    HGB 8.4 01/20/2021    HCT 27.2 01/20/2021         Assessment/Plan:  POD#1 cystoscopy, retrograde pyelogram, left ureteral stent placement   Left Hydronephrosis due to distal left ureteral stricture   Azotemia   Stage IV/Recurrent Squamous Cell Lung CA with mets to the kidney     Oncology following   Cont to watch the creatinine   UA reviewed  Antibiotics per primary   Cont the left ureteral stent  Cont the bourne catheter  Voiding trial prior to discharge, orders placed  Will need outpatient follow up and stent change in 4 to 6 months             Rachelle Hong, [address] [facility]  Urology

## 2021-01-20 NOTE — PROGRESS NOTES
OT BEDSIDE TREATMENT NOTE      Date:2021  Patient Name: Johan Lundberg  MRN: [id number]  : 1945  Room: [address] [place] [address]     Referring Provider: DR Nadia Perez  Evaluating OT: Jeremy Cabrera OTR/L [id number]     Placement Recommendation: Home with no skilled occupational therapy needed after discharge from inpatient. Recommended Adaptive Equipment: none      [facility]   AM-PAC Daily Activity Inpatient   How much help for putting on and taking off regular lower body clothing?: A Little  How much help for Bathing?: A Little  How much help for Toileting?: A Little  How much help for putting on and taking off regular upper body clothing?: A Little  How much help for taking care of personal grooming?: A Little  How much help for eating meals?: None  AM-PAC Inpatient Daily Activity Raw Score: 19  AM-PAC Inpatient ADL T-Scale Score : 40.22  ADL Inpatient CMS 0-100% Score: 42.8  ADL Inpatient CMS G-Code Modifier : CK     Based on patient's functional performance as stated below and their level of assistance needed prior to admission, this therapist believes that the patient would benefit from skilled Occupational Therapy following their hospital stay in an effort to increase safety and independence with completion of ADL/IADL tasks for functional independence and quality of life.      Diagnosis:   1. Acute kidney injury ([facility] [address]    2. Hyperkalemia    3. Extravasation of vesicant agent       Pertinent Medical History:   Past Medical History        Past Medical History:   Diagnosis Date    Heart attack ([facility] [address] 2009    Hypertension 2009    Lung cancer [facility]) 2017    Renal mass           Precautions:  Falls  Pain Scale: Numeric Rate: no pain; Nursing notified. Social history: with family: spouse                             Drive: yes   Home architecture: single family, 2 story, resides on , 2 steps to enter with rail, tub shower. Functional Mobility Supervision with straight cane to improve balance, verbal cues for sequence and safety.   Supervision to min A with s/c for functional mobility for household distances in room and hallway; pt demo'd R lateral LOB x 1 rep with min A to correct balance Modified Towner    Activity Tolerance Fair   fair Good         Comments: Patient cleared by nursing staff. Upon arrival pt seated EOB eating lunch. Pt agreeable to OT tx session. Pt educated with regards to  hand placement, safety awareness, static sitting balance,  standing balance, transfer training, functional mobility,  LE/UE dressing, ECT's, self feeding. At end of session pt seated in chair  with all lines and tubes intact, call light within reach. Overall, pt demonstrated fair/fair minus independence and safety during completion of ADL/functional transfers/mobility tasks. Pt would benefit from continued skilled OT to increase safety, balance and independence with completion of ADL/IADL tasks for functional independence and quality of life. Pt required cues and education as noted above for safe facilitation and completion of tasks. Therapist provided skilled monitoring of patient's response during treatment session. Prior to and at the end of session, environmental modifications /line management completed for patients safety and efficiency of treatment session. Overall, patient demonstrates minimal difficulties with completion of BADLs and IADLs. Factors contributing to these difficulties include slightly unsteady with R lateral LOB noted x 1 rep, decreased endurance, and generalized weakness. As noted above, patient likely to benefit from further OT intervention to increase independence, safety, and overall quality of life. Treatment:     ? Functional transfers: Facilitated transfers from EOB to chair with cues for body alignment, safety and hand placement. ? ADL completion: Self-care retraining for the above-mentioned ADLs; training on proper hand placement, safety technique, sequencing, and energy conservation techniques. ? Postural Balance: Sitting/standing balance retraining to improve righting reactions with postural changes during ADLs.     · Pt has made fair progress towards set goals     ·   OT 1-3x/week for 5-7 days during hospitalization       Treatment Time In: 12:03 PM  Treatment Time Out: 12:18 PM           Treatment Charges: Mins Units   ADL/Home Mgt     [id number]     Thera Activities     [id number] 15 1   Ther Ex                 [id number]     Manual Therapy    80497     Neuro Re-ed         01548     Orthotic manage/training                               [id number]     Non Billable Time     Total Timed Treatment 15 [address] [facility], [address] [place] [address]

## 2021-01-20 NOTE — PROGRESS NOTES
1/20/2021  [id number]  [id number]      Patient observed ambulating independently in room holding onto IV pole.        Pablo Monroy PTA   LIC# [id number]

## 2021-01-20 NOTE — PROGRESS NOTES
Progress Note    Subjective:  Patient has cystoscopy with stent placed yesterday. Did well with the procedure. He is feeling okay. Some mild fatigue. No sores in mouth. No chest pain or palpitations. No nausea. Objective:    /85   Pulse 91   Temp 97.2 °F (36.2 °C) (Oral)   Resp 18   Ht 5' 7\" (1.702 m)   Wt 183 lb 3.2 oz (83.1 kg)   SpO2 97%   BMI 28.69 kg/m²     General: Pleasant gentleman awake alert oriented x3. No apparent distress  HEENT: Anicteric sclera, oropharynx is clear, no mucositis or thrush  Heart:  RRR, no murmurs, gallops, or rubs. Lungs:  CTA bilaterally, no wheeze, rales or rhonchi  Abd: bowel sounds present, nontender, nondistended, no masses  Extrem: Warm. CBC:   Lab Results   Component Value Date    WBC 4.6 01/20/2021    RBC 2.73 01/20/2021    HGB 8.4 01/20/2021    HCT 27.2 01/20/2021    MCV 99.6 01/20/2021    MCH 30.8 01/20/2021    MCHC 30.9 01/20/2021    RDW 19.0 01/20/2021     01/20/2021    MPV 11.1 01/20/2021     CMP:    Lab Results   Component Value Date     01/20/2021    K 4.9 01/20/2021    K 5.6 01/15/2021     01/20/2021    CO2 23 01/20/2021    BUN 49 01/20/2021    CREATININE 3.2 01/20/2021    GFRAA 23 01/20/2021    LABGLOM 19 01/20/2021    GLUCOSE 124 01/20/2021    PROT 6.2 01/15/2021    LABALBU 3.3 01/15/2021    CALCIUM 8.1 01/20/2021    BILITOT 0.3 01/15/2021    ALKPHOS 68 01/15/2021    AST 13 01/15/2021    ALT 7 01/15/2021            XR UROGRAM W OR WO KUB W OR WO TOMOGRAM   Final Result   1. Cystoscopy, bilateral retrograde pyelography, and left ureteral stent   insertion with fluoroscopic guidance. 2.  Please see separate procedure report for details. NM RENAL WITH LASIX   Final Result   Normal flow and function of the right kidney without obstruction. Abnormally functioning left kidney with delayed slow washout following Lasix   administration, suggestive of high-grade obstruction.   Correlation with retrograde pyelography may be helpful. Split renal uptake is 65% on the left and 35% on the right. CT ABDOMEN PELVIS WO CONTRAST   Final Result   Moderate left and minimal right hydronephrosis, no sign of obstructing   calculus or mass, suspect bladder outlet obstruction   Small pleural effusions, may minimal free intraperitoneal fluid. Left lower lobe atelectasis      US RETROPERITONEAL COMPLETE   Final Result   Right kidney is mildly atrophic. Mild hydronephrosis of left kidney. Bladder is unremarkable.  Left urinary jet is not visualized            Current Facility-Administered Medications:     promethazine (PHENERGAN) injection 25 mg, 25 mg, Intravenous, PRN, Debbie Chavez MD    ipratropium (ATROVENT) 0.02 % nebulizer solution 0.5 mg, 0.5 mg, Nebulization, TID, Ciara Pan MD, 0.5 mg at 01/20/21 8011    vitamin B-12 (CYANOCOBALAMIN) tablet 1,000 mcg, 1,000 mcg, Oral, Daily, Debbie Chavez MD, 1,000 mcg at 01/20/21 2251    zinc sulfate (ZINCATE) capsule 50 mg, 50 mg, Oral, Daily, Debbie Chavez MD, 50 mg at 01/20/21 6905    therapeutic multivitamin-minerals 1 tablet, 1 tablet, Oral, Daily, Debbie Chavez MD, 1 tablet at 01/20/21 1741    magnesium oxide (MAG-OX) tablet 400 mg, 400 mg, Oral, Daily, Debbie Chavez MD, 400 mg at 01/20/21 0222    enalapril (VASOTEC) tablet 10 mg, 10 mg, Oral, Daily, Debbie Chavez MD, 10 mg at 01/20/21 2827    diphenhydrAMINE (BENADRYL) tablet 50 mg, 50 mg, Oral, Daily, Debbie Chavez MD, 50 mg at 01/20/21 6715    calcium elemental (OSCAL) tablet 500 mg, 500 mg, Oral, Daily, Debbie Chavez MD, 500 mg at 01/20/21 1158    lactated ringers infusion, , Intravenous, Continuous, Debbie Chavez MD, Last Rate: 125 mL/hr at 01/20/21 0754, New Bag at 01/20/21 0754    oxyCODONE-acetaminophen (PERCOCET) 5-325 MG per tablet 1 tablet, 1 tablet, Oral, Q4H PRN, Ciara Pan MD   sodium bicarbonate tablet 1,300 mg, 1,300 mg, Oral, BID, Aj Chavez MD, 1,300 mg at 01/20/21 0833    0.9 % sodium chloride infusion, 1,000 mL, Intravenous, Continuous, Aj Chavez MD, Last Rate: 50 mL/hr at 01/18/21 1014, 1,000 mL at 01/18/21 1014    [COMPLETED] hyaluronidase human (HYLENEX) injection 15 Units, 15 Units, Subcutaneous, Once, 15 Units at 01/15/21 1300 **AND** sodium chloride (PF) 0.9 % injection 0.9 mL, 0.9 mL, Intravenous, Once, Yoon Mayfield MD    ALPRAZolam Theresa Shiloh) tablet 0.5 mg, 0.5 mg, Oral, PRN, Yoon Mayfield MD    aspirin EC tablet 81 mg, 81 mg, Oral, Daily, Yoon Mayfield MD, 81 mg at 01/20/21 0834    atorvastatin (LIPITOR) tablet 20 mg, 20 mg, Oral, Daily, Yoon Mayfield MD, 20 mg at 01/20/21 8710    calcium carbonate (TUMS) chewable tablet 500 mg, 1 tablet, Oral, Daily PRN, Yoon Mayfield MD    carvedilol (COREG) tablet 12.5 mg, 12.5 mg, Oral, BID WC, Aj Chavez MD, 12.5 mg at 01/20/21 0834    dexamethasone (DECADRON) injection 4 mg, 4 mg, Oral, Q24H, Yono Mayfield MD, 4 mg at 01/19/21 1917    tamsulosin (FLOMAX) capsule 0.4 mg, 0.4 mg, Oral, Nightly, Aj Chavez MD, 0.4 mg at 01/19/21 2018    gabapentin (NEURONTIN) capsule 600 mg, 600 mg, Oral, BID, Yoon Mayfield MD, 600 mg at 01/20/21 8617    sodium chloride flush 0.9 % injection 10 mL, 10 mL, Intravenous, 2 times per day, Yoon Mayfield MD, 10 mL at 01/19/21 2019    sodium chloride flush 0.9 % injection 10 mL, 10 mL, Intravenous, PRN, Aj Chavez MD    promethazine (PHENERGAN) tablet 12.5 mg, 12.5 mg, Oral, Q6H PRN **OR** ondansetron (ZOFRAN) injection 4 mg, 4 mg, Intravenous, Q6H PRN, Aj Chavez MD    polyethylene glycol (GLYCOLAX) packet 17 g, 17 g, Oral, Daily PRN, Yoon Mayfield MD   acetaminophen (TYLENOL) tablet 650 mg, 650 mg, Oral, Q6H PRN **OR** acetaminophen (TYLENOL) suppository 650 mg, 650 mg, Rectal, Q6H PRN, Jabier Jose MD    heparin (porcine) injection 5,000 Units, 5,000 Units, Subcutaneous, 3 times per day, Jabier Jose MD, 5,000 Units at 01/20/21 0531    Assessment:    Principal Problem:    Acute kidney injury superimposed on CKD 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 *        Plan:  77-year-old gentleman known to Dr. Alejandro Lewis the [facility] with metastasis to the kidney discovered with mets August 2019.   He has had multiple treatments most recently had be started Cyramza and Docetaxel on 5/14/20, last tx on 1/7/2021. He is admitted with Shalini Perazaf may have outlet obstruction on renal scan. He had a cystoscopy January 19, 2021 that revealed strictured distal ureter left side. He had a stent placed. Creatinine has remained unchanged. Hopefully, in the next few days the renal function will improve. Mild anemia secondary to chemotherapy as well as renal insufficiency. No need for intervention at this time.   F/u with Dr. Zak Mcdonald after discharge        Electronically signed by Giovanna Lira MD on 1/20/2021 at 12:04 PM

## (undated) DEVICE — CATHETER URET 5FR L70CM OPN END SGL LUMN INJ HUB FLEXIMA

## (undated) DEVICE — CYSTO PACK: Brand: MEDLINE INDUSTRIES, INC.

## (undated) DEVICE — TUBING, SUCTION, 1/4" X 10', STRAIGHT: Brand: MEDLINE

## (undated) DEVICE — GLOVE ORANGE PI 7 1/2   MSG9075

## (undated) DEVICE — Z INACTIVE USE 2660664 SOLUTION IRRIG 3000ML 0.9% SOD CHL USP UROMATIC PLAS CONT

## (undated) DEVICE — BAG DRNGE COMB PK

## (undated) DEVICE — CAMERA STRYKER 1488 HD GEN

## (undated) DEVICE — SOLUTION IV IRRIG WATER 1000ML POUR BRL 2F7114

## (undated) DEVICE — TOWEL,OR,DSP,ST,BLUE,STD,6/PK,12PK/CS: Brand: MEDLINE

## (undated) DEVICE — GUIDEWIRE ENDOSCP L150CM DIA0.035IN TIP 3CM PTFE NIT

## (undated) DEVICE — GOWN,SIRUS,NONRNF,SETINSLV,XL,20/CS: Brand: MEDLINE

## (undated) DEVICE — CIRCON 21F CYSTO TRAY

## (undated) DEVICE — GARMENT,MEDLINE,DVT,INT,CALF,MED, GEN2: Brand: MEDLINE

## (undated) DEVICE — Z INACTIVE USE 2635503 SOLUTION IRRIG 3000ML ST H2O USP UROMATIC PLAS CONT

## (undated) DEVICE — CATHETER F BLLN 5CC 14FR 2 W HYDRGEL COAT LESS TRAUM LUB

## (undated) DEVICE — CIRCON 30/70 URO LENS

## (undated) DEVICE — BASIC SINGLE BASIN 1-LF: Brand: MEDLINE INDUSTRIES, INC.